# Patient Record
Sex: MALE | Race: WHITE | Employment: FULL TIME | URBAN - METROPOLITAN AREA
[De-identification: names, ages, dates, MRNs, and addresses within clinical notes are randomized per-mention and may not be internally consistent; named-entity substitution may affect disease eponyms.]

---

## 2019-12-07 ENCOUNTER — HOSPITAL ENCOUNTER (OUTPATIENT)
Age: 59
Discharge: HOME | End: 2019-12-07
Attending: FAMILY MEDICINE
Payer: COMMERCIAL

## 2019-12-07 VITALS
WEIGHT: 240 LBS | OXYGEN SATURATION: 96 % | SYSTOLIC BLOOD PRESSURE: 144 MMHG | RESPIRATION RATE: 18 BRPM | HEIGHT: 73 IN | DIASTOLIC BLOOD PRESSURE: 83 MMHG | TEMPERATURE: 97 F | HEART RATE: 79 BPM | BODY MASS INDEX: 31.81 KG/M2

## 2019-12-07 DIAGNOSIS — J01.00 ACUTE MAXILLARY SINUSITIS, RECURRENCE NOT SPECIFIED: Primary | ICD-10-CM

## 2019-12-07 DIAGNOSIS — Z86.69 HISTORY OF SLEEP APNEA: ICD-10-CM

## 2019-12-07 DIAGNOSIS — K12.2 UVULITIS: ICD-10-CM

## 2019-12-07 PROCEDURE — 99204 OFFICE O/P NEW MOD 45 MIN: CPT

## 2019-12-07 PROCEDURE — 99203 OFFICE O/P NEW LOW 30 MIN: CPT

## 2019-12-07 RX ORDER — AMOXICILLIN AND CLAVULANATE POTASSIUM 875; 125 MG/1; MG/1
1 TABLET, FILM COATED ORAL 2 TIMES DAILY
Qty: 20 TABLET | Refills: 0 | Status: SHIPPED | OUTPATIENT
Start: 2019-12-07 | End: 2019-12-17

## 2019-12-07 RX ORDER — RANITIDINE 150 MG/1
150 CAPSULE ORAL 2 TIMES DAILY
COMMUNITY
End: 2021-09-21

## 2019-12-07 NOTE — ED INITIAL ASSESSMENT (HPI)
Pt c/o productive cough with brownish-greenish colored phlegm, post nasal drip, a little achy, chills. Symptoms x 6 days. Denies MURALI, no known fever.

## 2019-12-07 NOTE — ED PROVIDER NOTES
Patient Seen in: [facility]      History   Patient presents with:  Cough/URI    Stated Complaint: congestion and turning into cough x 6 days    HPI  This is a 43-year-old male coming in with complaints of a productive cough normal, nares normal  NECK: FROM, supple anterior cervical lymphadenopathy noted bilaterally  LUNGS: CTAB, no RRW  CV: RRR  ABD: not distended  NEURO: Alert and oriented to person place and time  GAIT: Normal      ED Course   Labs Reviewed - No data to dis Impression:  Acute maxillary sinusitis, recurrence not specified  (primary encounter diagnosis)  Uvulitis  History of sleep apnea    Disposition:  Discharge  12/7/2019  1:14 pm    Follow-up:  Dorian Martin, [address]

## 2021-09-21 PROCEDURE — 99282 EMERGENCY DEPT VISIT SF MDM: CPT

## 2021-09-21 PROCEDURE — 12001 RPR S/N/AX/GEN/TRNK 2.5CM/<: CPT

## 2021-09-21 PROCEDURE — 99283 EMERGENCY DEPT VISIT LOW MDM: CPT

## 2021-09-21 RX ORDER — FAMOTIDINE 20 MG/1
20 TABLET ORAL DAILY
COMMUNITY

## 2021-09-22 ENCOUNTER — HOSPITAL ENCOUNTER (EMERGENCY)
Facility: HOSPITAL | Age: 61
Discharge: HOME | End: 2021-09-22
Attending: EMERGENCY MEDICINE
Payer: COMMERCIAL

## 2021-09-22 VITALS
DIASTOLIC BLOOD PRESSURE: 97 MMHG | HEIGHT: 73 IN | TEMPERATURE: 96 F | BODY MASS INDEX: 33.13 KG/M2 | OXYGEN SATURATION: 97 % | SYSTOLIC BLOOD PRESSURE: 141 MMHG | WEIGHT: 250 LBS | RESPIRATION RATE: 18 BRPM | HEART RATE: 68 BPM

## 2021-09-22 DIAGNOSIS — S91.311A LACERATION OF RIGHT FOOT, INITIAL ENCOUNTER: Primary | ICD-10-CM

## 2021-09-22 NOTE — ED PROVIDER NOTES
Patient Seen in: [facility] Emergency Department      History   Patient presents with:  Leg or Foot Injury: Glass object dropped and broken on foot.      Stated Complaint:     Subjective:   HPI    Patient is a 29-year-old male presents emergency room both lower extremities.   There is a superficial abrasion noted to the medial aspect of the right foot there is also a separate superficial laceration noted to the medial aspect of the right foot measuring approximately 1.5 cm in length with no evidence of

## 2021-09-30 ENCOUNTER — HOSPITAL ENCOUNTER (OUTPATIENT)
Age: 61
Discharge: HOME | End: 2021-09-30
Payer: COMMERCIAL

## 2021-09-30 VITALS
RESPIRATION RATE: 16 BRPM | OXYGEN SATURATION: 96 % | HEART RATE: 81 BPM | HEIGHT: 73 IN | BODY MASS INDEX: 33.13 KG/M2 | WEIGHT: 250 LBS | DIASTOLIC BLOOD PRESSURE: 90 MMHG | SYSTOLIC BLOOD PRESSURE: 145 MMHG | TEMPERATURE: 99 F

## 2021-09-30 DIAGNOSIS — Z48.02 ENCOUNTER FOR REMOVAL OF SUTURES: Primary | ICD-10-CM

## 2021-09-30 NOTE — ED PROVIDER NOTES
Patient Seen in: [facility] [address]      History   Patient presents with:  Sut Stap Vel    Stated Complaint: suture removal     Subjective:   HPI    65yo male presents to IC for suture removal. Had sutures placed to right foot 9 days Course   Labs Reviewed - No data to display                MDM      65yo male presents for suture removal. All sutures removed without difficulty.                              Disposition and Plan     Clinical Impression:  Encounter for removal of sutures

## 2024-03-11 ENCOUNTER — APPOINTMENT (OUTPATIENT)
Dept: GENERAL RADIOLOGY | Facility: HOSPITAL | Age: 64
End: 2024-03-11
Attending: EMERGENCY MEDICINE
Payer: COMMERCIAL

## 2024-03-11 ENCOUNTER — HOSPITAL ENCOUNTER (EMERGENCY)
Facility: HOSPITAL | Age: 64
Discharge: HOME | End: 2024-03-11
Attending: EMERGENCY MEDICINE
Payer: COMMERCIAL

## 2024-03-11 ENCOUNTER — APPOINTMENT (OUTPATIENT)
Dept: CV DIAGNOSTICS | Facility: HOSPITAL | Age: 64
End: 2024-03-11
Attending: EMERGENCY MEDICINE
Payer: COMMERCIAL

## 2024-03-11 VITALS
HEART RATE: 62 BPM | HEIGHT: 73 IN | BODY MASS INDEX: 32.47 KG/M2 | RESPIRATION RATE: 17 BRPM | DIASTOLIC BLOOD PRESSURE: 76 MMHG | OXYGEN SATURATION: 94 % | WEIGHT: 245 LBS | TEMPERATURE: 97 F | SYSTOLIC BLOOD PRESSURE: 132 MMHG

## 2024-03-11 DIAGNOSIS — R07.89 ACUTE CHEST WALL PAIN: Primary | ICD-10-CM

## 2024-03-11 LAB
ALBUMIN SERPL-MCNC: 4.3 G/DL (ref 3.4–5)
ALBUMIN/GLOB SERPL: 1.1 {RATIO} (ref 1–2)
ALP LIVER SERPL-CCNC: 91 U/L
ALT SERPL-CCNC: 59 U/L
ANION GAP SERPL CALC-SCNC: 3 MMOL/L (ref 0–18)
AST SERPL-CCNC: 54 U/L (ref 15–37)
BASOPHILS # BLD AUTO: 0.09 X10(3) UL (ref 0–0.2)
BASOPHILS NFR BLD AUTO: 1 %
BILIRUB SERPL-MCNC: 0.7 MG/DL (ref 0.1–2)
BUN BLD-MCNC: 26 MG/DL (ref 9–23)
CALCIUM BLD-MCNC: 10.3 MG/DL (ref 8.5–10.1)
CHLORIDE SERPL-SCNC: 106 MMOL/L (ref 98–112)
CO2 SERPL-SCNC: 26 MMOL/L (ref 21–32)
CREAT BLD-MCNC: 0.98 MG/DL
EGFRCR SERPLBLD CKD-EPI 2021: 88 ML/MIN/1.73M2 (ref 60–?)
EOSINOPHIL # BLD AUTO: 0.31 X10(3) UL (ref 0–0.7)
EOSINOPHIL NFR BLD AUTO: 3.6 %
ERYTHROCYTE [DISTWIDTH] IN BLOOD BY AUTOMATED COUNT: 12.4 %
GLOBULIN PLAS-MCNC: 3.8 G/DL (ref 2.8–4.4)
GLUCOSE BLD-MCNC: 125 MG/DL (ref 70–99)
HCT VFR BLD AUTO: 48.8 %
HGB BLD-MCNC: 16.8 G/DL
IMM GRANULOCYTES # BLD AUTO: 0.07 X10(3) UL (ref 0–1)
IMM GRANULOCYTES NFR BLD: 0.8 %
LYMPHOCYTES # BLD AUTO: 3.44 X10(3) UL (ref 1–4)
LYMPHOCYTES NFR BLD AUTO: 39.7 %
MCH RBC QN AUTO: 30.8 PG (ref 26–34)
MCHC RBC AUTO-ENTMCNC: 34.4 G/DL (ref 31–37)
MCV RBC AUTO: 89.5 FL
MONOCYTES # BLD AUTO: 0.67 X10(3) UL (ref 0.1–1)
MONOCYTES NFR BLD AUTO: 7.7 %
NEUTROPHILS # BLD AUTO: 4.09 X10 (3) UL (ref 1.5–7.7)
NEUTROPHILS # BLD AUTO: 4.09 X10(3) UL (ref 1.5–7.7)
NEUTROPHILS NFR BLD AUTO: 47.2 %
OSMOLALITY SERPL CALC.SUM OF ELEC: 286 MOSM/KG (ref 275–295)
PLATELET # BLD AUTO: 147 10(3)UL (ref 150–450)
POTASSIUM SERPL-SCNC: 4.5 MMOL/L (ref 3.5–5.1)
PROT SERPL-MCNC: 8.1 G/DL (ref 6.4–8.2)
RBC # BLD AUTO: 5.45 X10(6)UL
SARS-COV-2 RNA RESP QL NAA+PROBE: NOT DETECTED
SODIUM SERPL-SCNC: 135 MMOL/L (ref 136–145)
TROPONIN I SERPL HS-MCNC: 11 NG/L
TROPONIN I SERPL HS-MCNC: 17 NG/L
WBC # BLD AUTO: 8.7 X10(3) UL (ref 4–11)

## 2024-03-11 PROCEDURE — 93350 STRESS TTE ONLY: CPT | Performed by: EMERGENCY MEDICINE

## 2024-03-11 PROCEDURE — 93005 ELECTROCARDIOGRAM TRACING: CPT

## 2024-03-11 PROCEDURE — 93010 ELECTROCARDIOGRAM REPORT: CPT

## 2024-03-11 PROCEDURE — 85025 COMPLETE CBC W/AUTO DIFF WBC: CPT | Performed by: EMERGENCY MEDICINE

## 2024-03-11 PROCEDURE — 99285 EMERGENCY DEPT VISIT HI MDM: CPT

## 2024-03-11 PROCEDURE — 93018 CV STRESS TEST I&R ONLY: CPT | Performed by: EMERGENCY MEDICINE

## 2024-03-11 PROCEDURE — 96374 THER/PROPH/DIAG INJ IV PUSH: CPT

## 2024-03-11 PROCEDURE — 71045 X-RAY EXAM CHEST 1 VIEW: CPT | Performed by: EMERGENCY MEDICINE

## 2024-03-11 PROCEDURE — 93017 CV STRESS TEST TRACING ONLY: CPT | Performed by: EMERGENCY MEDICINE

## 2024-03-11 PROCEDURE — 80053 COMPREHEN METABOLIC PANEL: CPT | Performed by: EMERGENCY MEDICINE

## 2024-03-11 PROCEDURE — 84484 ASSAY OF TROPONIN QUANT: CPT | Performed by: EMERGENCY MEDICINE

## 2024-03-11 RX ORDER — KETOROLAC TROMETHAMINE 15 MG/ML
15 INJECTION, SOLUTION INTRAMUSCULAR; INTRAVENOUS ONCE
Status: COMPLETED | OUTPATIENT
Start: 2024-03-11 | End: 2024-03-11

## 2024-03-11 RX ORDER — TAMSULOSIN HYDROCHLORIDE 0.4 MG/1
0.4 CAPSULE ORAL
COMMUNITY

## 2024-03-11 RX ORDER — MULTIVITAMIN WITH IRON
50 TABLET ORAL DAILY
COMMUNITY

## 2024-03-11 RX ORDER — LISINOPRIL 10 MG/1
10 TABLET ORAL DAILY
COMMUNITY

## 2024-03-11 NOTE — ED PROVIDER NOTES
Patient Seen in: [facility] Emergency Department      History     Chief Complaint   Patient presents with    Chest Pain Angina     Stated Complaint: cp since 0230    Subjective:   HPI    63-year-old male presents reporting chest pain that began 4 hours ago.  He describes a pressure to the midsternal and left chest area.  He does report that some movements seem to make the pain worse.  He feels a bit lightheaded with it.  Denies any significant shortness of breath.  No palpitations.  No swelling in the extremities.  He reports some swimming yesterday and does not know if it could be musculoskeletal.  He says the pain fluctuates in intensity.    Objective:   Past Medical History:   Diagnosis Date    Anxiety     Diabetes (HCC)     Esophageal reflux     Essential hypertension     Gout     Hyperlipidemia               Past Surgical History:   Procedure Laterality Date    ANGIOGRAM  2009    TONSILLECTOMY                  Social History     Socioeconomic History    Marital status:    Tobacco Use    Smoking status: Never    Smokeless tobacco: Never   Vaping Use    Vaping Use: Never used   Substance and Sexual Activity    Alcohol use: Not Currently              Review of Systems    Positive for stated complaint: cp since 0230  Other systems are as noted in HPI.  Constitutional and vital signs reviewed.      All other systems reviewed and negative except as noted above.    Physical Exam     ED Triage Vitals [03/11/24 0624]   /85   Pulse 72   Resp 16   Temp 97 °F (36.1 °C)   Temp src Temporal   SpO2 99 %   O2 Device None (Room air)       Current:/76   Pulse 70   Temp 97 °F (36.1 °C) (Temporal)   Resp 18   Ht 185.4 cm (6' 1\")   Wt 111.1 kg   SpO2 97%   BMI 32.32 kg/m²         Physical Exam    General:  Vitals as listed.  No acute distress   HEENT: Sclerae anicteric.  Conjunctivae show no pallor.  Oropharynx clear, mucous membranes moist   Neck: supple, no rigidity   Lungs: good air exchange and  clear   Heart: regular rate rhythm and no murmur   Abdomen: Soft and nontender.  No abdominal masses.  No peritoneal signs   MSK: There is tenderness on palpation over the left anterior chest wall in the area of the costochondral margin.  Difficult to determine if this is the same pain that brought him to the emergency room.  No edema, normal peripheral pulses   Neuro: Alert oriented and nonfocal   Skin: no rashes or nodules    ED Course     Labs Reviewed   COMP METABOLIC PANEL (14) - Abnormal; Notable for the following components:       Result Value    Glucose 125 (*)     Sodium 135 (*)     BUN 26 (*)     Calcium, Total 10.3 (*)     AST 54 (*)     All other components within normal limits   CBC W/ DIFFERENTIAL - Abnormal; Notable for the following components:    .0 (*)     All other components within normal limits   TROPONIN I HIGH SENSITIVITY - Normal   TROPONIN I HIGH SENSITIVITY - Normal   RAPID SARS-COV-2 BY PCR - Normal   CBC WITH DIFFERENTIAL WITH PLATELET    Narrative:     The following orders were created for panel order CBC With Differential With Platelet.  Procedure                               Abnormality         Status                     ---------                               -----------         ------                     CBC W/ DIFFERENTIAL[[id number]]          Abnormal            Final result                 Please view results for these tests on the individual orders.   RAINBOW DRAW LAVENDER   RAINBOW DRAW LIGHT GREEN   RAINBOW DRAW BLUE     EKG    Rate, intervals and axes as noted on EKG Report.  Rate: 69  Rhythm: Sinus Rhythm  Reading: No evidence of acute ischemia                 XR CHEST AP PORTABLE  (CPT=71045)    Result Date: 3/11/2024  PROCEDURE:  XR CHEST AP PORTABLE  (CPT=71045)  TECHNIQUE:  AP chest radiograph was obtained.  COMPARISON:  [facility]REA , XR, CHEST AP PORT, 3/28/2013, 2:54 AM.  INDICATIONS:  cp since 0230  PATIENT STATED HISTORY: (As transcribed by Technologist)  Patient  states he has left-sided chest pain that started last night.    FINDINGS:  Cardiomediastinal silhouette is within normal limits in size.  Linear opacities adjacent to the left costophrenic angle are most consistent with atelectasis and or scar.  No focal consolidation, pleural effusion, or pneumothorax.            CONCLUSION:  Findings as detailed above suggest left lower lobe atelectasis and or scar, correlate clinically.  No focal consolidation.   LOCATION:  [facility]      Dictated by (CST): Gaviota Diaz MD on 3/11/2024 at 8:09 AM     Finalized by (CST): Gaviota Diaz MD on 3/11/2024 at 8:10 AM               MDM      63-year-old male presents with 4 hours of pressure to the left side of the chest.  History of hypertension and diabetes.  He reports his sister  suddenly of a heart attack at the age of 65.    Additional history obtained by patient's spouse at the bedside reports that there is a family history of cardiac disease and that the patient's sister, who was very healthy and an athlete,  suddenly of a heart attack at the age of 65    Differential includes but is not limited to musculoskeletal pain, cardiac ischemia, pneumothorax, a life threat.    CBC, CMP, troponin, chest x-ray ordered for further evaluation.    My independent interpretation of chest x-ray is that there is no pneumothorax.    Initial cardiac enzyme is within normal limits.  EKG does not show ST elevation MI.  Patient does have a few risk factors.    Cardiac enzyme negative x 2 at a 2-hour interval.  Patient also had a stress echo.  Dr. Daugherty reports that stress echo was normal and patient is cleared from a cardiology standpoint.  He did have significant improvement in his pain after Toradol.  I believe this appears more musculoskeletal.  His pain is in the area of the costochondral margin but could also be related to muscle pain from swimming yesterday.  Discussed OTC pain medications and ice/heat to the area of tenderness.  Recommend  follow-up with primary care doctor.  He is comfortable with this plan and relieved that the workup today is unremarkable.  Instructed to return with worsening symptoms or any concerns.                                       Medical Decision Making      Disposition and Plan     Clinical Impression:  1. Acute chest wall pain         Disposition:  Discharge  3/11/2024  1:07 pm    Follow-up:  Cely Mariscal MD  [address] [address] [phone]    Schedule an appointment as soon as possible for a visit      Ernie Daugherty MD  [address] [place] [address] [phone]    Follow up  Cardiology specialist, As needed          Medications Prescribed:  Current Discharge Medication List

## 2024-03-11 NOTE — PROGRESS NOTES
Here for stress echo.  Walked for 6:46 on a Valentín and achieved a target HR 93% APMHR.  Pain free throughout exam.  Returned to ED  B2 per w/c with wife.

## 2024-03-12 LAB
ATRIAL RATE: 69 BPM
P AXIS: 47 DEGREES
P-R INTERVAL: 236 MS
Q-T INTERVAL: 358 MS
QRS DURATION: 84 MS
QTC CALCULATION (BEZET): 383 MS
R AXIS: 7 DEGREES
T AXIS: 44 DEGREES
VENTRICULAR RATE: 69 BPM

## 2024-09-07 ENCOUNTER — HOSPITAL ENCOUNTER (INPATIENT)
Facility: HOSPITAL | Age: 64
LOS: 1 days | Discharge: HOME | End: 2024-09-08
Attending: EMERGENCY MEDICINE | Admitting: STUDENT IN AN ORGANIZED HEALTH CARE EDUCATION/TRAINING PROGRAM
Payer: COMMERCIAL

## 2024-09-07 ENCOUNTER — APPOINTMENT (OUTPATIENT)
Dept: GENERAL RADIOLOGY | Facility: HOSPITAL | Age: 64
End: 2024-09-07
Attending: EMERGENCY MEDICINE
Payer: COMMERCIAL

## 2024-09-07 DIAGNOSIS — I48.91 ATRIAL FIBRILLATION WITH RAPID VENTRICULAR RESPONSE (HCC): Primary | ICD-10-CM

## 2024-09-07 LAB
ALBUMIN SERPL-MCNC: 4.6 G/DL (ref 3.2–4.8)
ALBUMIN/GLOB SERPL: 1.6 {RATIO} (ref 1–2)
ALP LIVER SERPL-CCNC: 105 U/L
ALT SERPL-CCNC: 35 U/L
ANION GAP SERPL CALC-SCNC: 11 MMOL/L (ref 0–18)
ANION GAP SERPL CALC-SCNC: 12 MMOL/L (ref 0–18)
APTT PPP: 26.9 SECONDS (ref 23–36)
APTT PPP: 34.6 SECONDS (ref 23–36)
APTT PPP: 36.7 SECONDS (ref 23–36)
AST SERPL-CCNC: 21 U/L (ref ?–34)
BASOPHILS # BLD AUTO: 0.08 X10(3) UL (ref 0–0.2)
BASOPHILS NFR BLD AUTO: 0.9 %
BILIRUB SERPL-MCNC: 0.4 MG/DL (ref 0.2–1.1)
BUN BLD-MCNC: 20 MG/DL (ref 9–23)
BUN BLD-MCNC: 24 MG/DL (ref 9–23)
CALCIUM BLD-MCNC: 10.2 MG/DL (ref 8.7–10.4)
CALCIUM BLD-MCNC: 10.4 MG/DL (ref 8.7–10.4)
CHLORIDE SERPL-SCNC: 103 MMOL/L (ref 98–112)
CHLORIDE SERPL-SCNC: 105 MMOL/L (ref 98–112)
CO2 SERPL-SCNC: 22 MMOL/L (ref 21–32)
CO2 SERPL-SCNC: 23 MMOL/L (ref 21–32)
CREAT BLD-MCNC: 0.81 MG/DL
CREAT BLD-MCNC: 0.89 MG/DL
D DIMER PPP FEU-MCNC: <0.27 UG/ML FEU (ref ?–0.61)
EGFRCR SERPLBLD CKD-EPI 2021: 100 ML/MIN/1.73M2 (ref 60–?)
EGFRCR SERPLBLD CKD-EPI 2021: 97 ML/MIN/1.73M2 (ref 60–?)
EOSINOPHIL # BLD AUTO: 0.31 X10(3) UL (ref 0–0.7)
EOSINOPHIL NFR BLD AUTO: 3.6 %
ERYTHROCYTE [DISTWIDTH] IN BLOOD BY AUTOMATED COUNT: 12.5 %
ERYTHROCYTE [DISTWIDTH] IN BLOOD BY AUTOMATED COUNT: 12.6 %
GLOBULIN PLAS-MCNC: 2.8 G/DL (ref 2–3.5)
GLUCOSE BLD-MCNC: 106 MG/DL (ref 70–99)
GLUCOSE BLD-MCNC: 128 MG/DL (ref 70–99)
GLUCOSE BLD-MCNC: 140 MG/DL (ref 70–99)
GLUCOSE BLD-MCNC: 145 MG/DL (ref 70–99)
GLUCOSE BLD-MCNC: 151 MG/DL (ref 70–99)
GLUCOSE BLD-MCNC: 165 MG/DL (ref 70–99)
GLUCOSE BLD-MCNC: 169 MG/DL (ref 70–99)
HCT VFR BLD AUTO: 45 %
HCT VFR BLD AUTO: 45.8 %
HGB BLD-MCNC: 16 G/DL
HGB BLD-MCNC: 16.2 G/DL
IMM GRANULOCYTES # BLD AUTO: 0.09 X10(3) UL (ref 0–1)
IMM GRANULOCYTES NFR BLD: 1 %
LYMPHOCYTES # BLD AUTO: 3.03 X10(3) UL (ref 1–4)
LYMPHOCYTES NFR BLD AUTO: 35.1 %
MAGNESIUM SERPL-MCNC: 1.8 MG/DL (ref 1.6–2.6)
MCH RBC QN AUTO: 31 PG (ref 26–34)
MCH RBC QN AUTO: 31.3 PG (ref 26–34)
MCHC RBC AUTO-ENTMCNC: 35.4 G/DL (ref 31–37)
MCHC RBC AUTO-ENTMCNC: 35.6 G/DL (ref 31–37)
MCV RBC AUTO: 87.6 FL
MCV RBC AUTO: 88.1 FL
MONOCYTES # BLD AUTO: 0.66 X10(3) UL (ref 0.1–1)
MONOCYTES NFR BLD AUTO: 7.6 %
NEUTROPHILS # BLD AUTO: 4.47 X10 (3) UL (ref 1.5–7.7)
NEUTROPHILS # BLD AUTO: 4.47 X10(3) UL (ref 1.5–7.7)
NEUTROPHILS NFR BLD AUTO: 51.8 %
NT-PROBNP SERPL-MCNC: <35 PG/ML (ref ?–125)
OSMOLALITY SERPL CALC.SUM OF ELEC: 292 MOSM/KG (ref 275–295)
OSMOLALITY SERPL CALC.SUM OF ELEC: 294 MOSM/KG (ref 275–295)
PLATELET # BLD AUTO: 162 10(3)UL (ref 150–450)
PLATELET # BLD AUTO: 165 10(3)UL (ref 150–450)
POTASSIUM SERPL-SCNC: 3.8 MMOL/L (ref 3.5–5.1)
POTASSIUM SERPL-SCNC: 4.2 MMOL/L (ref 3.5–5.1)
PROT SERPL-MCNC: 7.4 G/DL (ref 5.7–8.2)
RBC # BLD AUTO: 5.11 X10(6)UL
RBC # BLD AUTO: 5.23 X10(6)UL
SODIUM SERPL-SCNC: 138 MMOL/L (ref 136–145)
SODIUM SERPL-SCNC: 138 MMOL/L (ref 136–145)
T4 FREE SERPL-MCNC: 1.2 NG/DL (ref 0.8–1.7)
TROPONIN I SERPL HS-MCNC: <3 NG/L
TSI SER-ACNC: 7.29 MIU/ML (ref 0.55–4.78)
WBC # BLD AUTO: 10.4 X10(3) UL (ref 4–11)
WBC # BLD AUTO: 8.6 X10(3) UL (ref 4–11)

## 2024-09-07 PROCEDURE — 71045 X-RAY EXAM CHEST 1 VIEW: CPT | Performed by: EMERGENCY MEDICINE

## 2024-09-07 PROCEDURE — 99223 1ST HOSP IP/OBS HIGH 75: CPT | Performed by: STUDENT IN AN ORGANIZED HEALTH CARE EDUCATION/TRAINING PROGRAM

## 2024-09-07 RX ORDER — METOPROLOL SUCCINATE 25 MG/1
25 TABLET, EXTENDED RELEASE ORAL
Status: DISCONTINUED | OUTPATIENT
Start: 2024-09-07 | End: 2024-09-08

## 2024-09-07 RX ORDER — HEPARIN SODIUM 1000 [USP'U]/ML
5000 INJECTION, SOLUTION INTRAVENOUS; SUBCUTANEOUS ONCE
Status: COMPLETED | OUTPATIENT
Start: 2024-09-07 | End: 2024-09-07

## 2024-09-07 RX ORDER — NICOTINE POLACRILEX 4 MG
30 LOZENGE BUCCAL
Status: DISCONTINUED | OUTPATIENT
Start: 2024-09-07 | End: 2024-09-08

## 2024-09-07 RX ORDER — BENZONATATE 100 MG/1
200 CAPSULE ORAL 3 TIMES DAILY PRN
Status: DISCONTINUED | OUTPATIENT
Start: 2024-09-07 | End: 2024-09-08

## 2024-09-07 RX ORDER — SODIUM PHOSPHATE, DIBASIC AND SODIUM PHOSPHATE, MONOBASIC 7; 19 G/230ML; G/230ML
1 ENEMA RECTAL ONCE AS NEEDED
Status: DISCONTINUED | OUTPATIENT
Start: 2024-09-07 | End: 2024-09-08

## 2024-09-07 RX ORDER — MELATONIN
3 NIGHTLY PRN
Status: DISCONTINUED | OUTPATIENT
Start: 2024-09-07 | End: 2024-09-08

## 2024-09-07 RX ORDER — DILTIAZEM HYDROCHLORIDE 5 MG/ML
10 INJECTION INTRAVENOUS ONCE
Status: COMPLETED | OUTPATIENT
Start: 2024-09-07 | End: 2024-09-07

## 2024-09-07 RX ORDER — ROSUVASTATIN CALCIUM 5 MG/1
5 TABLET, COATED ORAL NIGHTLY
COMMUNITY

## 2024-09-07 RX ORDER — POLYETHYLENE GLYCOL 3350 17 G/17G
17 POWDER, FOR SOLUTION ORAL DAILY PRN
Status: DISCONTINUED | OUTPATIENT
Start: 2024-09-07 | End: 2024-09-08

## 2024-09-07 RX ORDER — ONDANSETRON 2 MG/ML
4 INJECTION INTRAMUSCULAR; INTRAVENOUS EVERY 6 HOURS PRN
Status: DISCONTINUED | OUTPATIENT
Start: 2024-09-07 | End: 2024-09-08

## 2024-09-07 RX ORDER — ACETAMINOPHEN 500 MG
500 TABLET ORAL EVERY 4 HOURS PRN
Status: DISCONTINUED | OUTPATIENT
Start: 2024-09-07 | End: 2024-09-08

## 2024-09-07 RX ORDER — SENNOSIDES 8.6 MG
17.2 TABLET ORAL NIGHTLY PRN
Status: DISCONTINUED | OUTPATIENT
Start: 2024-09-07 | End: 2024-09-08

## 2024-09-07 RX ORDER — BISACODYL 10 MG
10 SUPPOSITORY, RECTAL RECTAL
Status: DISCONTINUED | OUTPATIENT
Start: 2024-09-07 | End: 2024-09-08

## 2024-09-07 RX ORDER — MAGNESIUM OXIDE 400 MG/1
400 TABLET ORAL ONCE
Status: DISCONTINUED | OUTPATIENT
Start: 2024-09-07 | End: 2024-09-07

## 2024-09-07 RX ORDER — HEPARIN SODIUM AND DEXTROSE 10000; 5 [USP'U]/100ML; G/100ML
INJECTION INTRAVENOUS CONTINUOUS
Status: DISCONTINUED | OUTPATIENT
Start: 2024-09-07 | End: 2024-09-08

## 2024-09-07 RX ORDER — PROCHLORPERAZINE EDISYLATE 5 MG/ML
5 INJECTION INTRAMUSCULAR; INTRAVENOUS EVERY 8 HOURS PRN
Status: DISCONTINUED | OUTPATIENT
Start: 2024-09-07 | End: 2024-09-08

## 2024-09-07 RX ORDER — NICOTINE POLACRILEX 4 MG
15 LOZENGE BUCCAL
Status: DISCONTINUED | OUTPATIENT
Start: 2024-09-07 | End: 2024-09-08

## 2024-09-07 RX ORDER — ECHINACEA PURPUREA EXTRACT 125 MG
1 TABLET ORAL
Status: DISCONTINUED | OUTPATIENT
Start: 2024-09-07 | End: 2024-09-08

## 2024-09-07 RX ORDER — DEXTROSE MONOHYDRATE 25 G/50ML
50 INJECTION, SOLUTION INTRAVENOUS
Status: DISCONTINUED | OUTPATIENT
Start: 2024-09-07 | End: 2024-09-08

## 2024-09-07 RX ORDER — HEPARIN SODIUM 1000 [USP'U]/ML
3000 INJECTION, SOLUTION INTRAVENOUS; SUBCUTANEOUS ONCE
Status: COMPLETED | OUTPATIENT
Start: 2024-09-07 | End: 2024-09-07

## 2024-09-07 RX ORDER — ASPIRIN 81 MG/1
81 TABLET ORAL DAILY
COMMUNITY

## 2024-09-07 RX ORDER — HEPARIN SODIUM AND DEXTROSE 10000; 5 [USP'U]/100ML; G/100ML
1000 INJECTION INTRAVENOUS ONCE
Status: COMPLETED | OUTPATIENT
Start: 2024-09-07 | End: 2024-09-07

## 2024-09-07 RX ORDER — MAGNESIUM OXIDE 400 MG/1
400 TABLET ORAL ONCE
Status: COMPLETED | OUTPATIENT
Start: 2024-09-07 | End: 2024-09-07

## 2024-09-07 NOTE — CONSULTS
[facility]  Report of Consultation    Ambrose Palomares Patient Status:  Inpatient    10/30/1960 MRN [id number]   [facility] 3NE-A Attending Shlomo Downs, DO   Hosp Day # 0 PCP Cely Mariscal MD     Reason for Consultation:   AFib    History of Present Illness:   Ambrose Palomares is a(n) 63 year old male with a past history of HTN, HL, DM,  MITZY, and anxiety.  CT calcium score of 875 noted .  Stress Echo 3/24 had been performed with no ischemia noted.  Pt has presented after feeling \"off\" this week.  Had noted palpitations and mild orthopnea over the last week when lying in bed at night.  More notable lying on his L.  When up during the day, did not feel particularly poorly.  Has remained active.  No CP.  No SOB.  No presyncope or syncope.  Proceeded to ER where Fib with mildly elevated rate noted.          Past Medical History:   Past Medical History:    Anxiety    Diabetes (HCC)    Esophageal reflux    Essential hypertension    Gout    High blood pressure    High cholesterol    Hyperlipidemia    Visual impairment       Social History:   Smoking:  None  Alcohol:  None    Family History:   Positive family history of premature arthrosclerotic heart disease     Medications:   Scheduled:    insulin aspart  2-10 Units Subcutaneous q6h    magnesium oxide  400 mg Oral Once       Continuous Infusion:    dilTIAZem 5 mg/hr (24 1116)    continuous dose heparin         PRN Medications:     acetaminophen    melatonin    polyethylene glycol (PEG 3350)    sennosides    bisacodyl    fleet enema    ondansetron    prochlorperazine    benzonatate    glycerin-hypromellose-    sodium chloride    glucose **OR** glucose **OR** glucose-vitamin C **OR** dextrose **OR** glucose **OR** glucose **OR** glucose-vitamin C    Outpatient Medications:   No current facility-administered medications on file prior to encounter.     Current Outpatient Medications on File Prior to Encounter   Medication Sig Dispense  Refill    rosuvastatin 5 MG Oral Tab Take 1 tablet (5 mg total) by mouth nightly.      aspirin 81 MG Oral Tab EC Take 1 tablet (81 mg total) by mouth daily.      tamsulosin 0.4 MG Oral Cap Take 1 capsule (0.4 mg total) by mouth.      metFORMIN 500 MG Oral Tab Take 1 tablet (500 mg total) by mouth 2 (two) times daily with meals.      lisinopril 10 MG Oral Tab Take 1 tablet (10 mg total) by mouth daily.      Loratadine 5 MG Oral Chew Tab Chew 1 tablet (5 mg total) by mouth daily.      vitamin B-12 50 MCG Oral Tab Take 1 tablet (50 mcg total) by mouth daily.      famotidine 20 MG Oral Tab Take 1 tablet (20 mg total) by mouth 2 (two) times daily.      ALLOPURINOL OR Take by mouth.      ESCITALOPRAM OXALATE OR Take by mouth.      Ergocalciferol (VITAMIN D OR) Take by mouth.      Omega-3 Fatty Acids (FISH OIL OR) Take by mouth.         Allergies:   No Known Allergies    Review of Systems:   No fevers, chills, change in weight or bowel habits.  Ten point review of systems is otherwise negative or unremarkable.    Physical Exam:   Vitals:    09/07/24 0719   BP: 112/76   Pulse: 65   Resp: 19   Temp: 98 °F (36.7 °C)     Wt Readings from Last 3 Encounters:   09/07/24 240 lb 1.3 oz (108.9 kg)   03/11/24 245 lb (111.1 kg)   09/30/21 250 lb (113.4 kg)           General: Well developed, well nourished male.  Pt is in no acute distress.  HEENT:   Normocephalic.  Atraumatic.  Eyes with no scleral icterus.  Neck: Supple.  No JVD.  Carotids 2+ and equal in symmetric fashion.  No bruits are noted.  Cardiac: Irregular rate and rhythm.   There is a normal S1 and S2.  No S3 or S4.  No murmurs, rubs, or gallops.  PMI is non-displaced with a normal apical impulse.  Lungs: Clear to ascultation bilaterally.  No focal rales, rhonchi, or wheezes.  Good air movement is noted throughout all lung fields.  Abdomen: Soft.  Non-distended.  Non-tender.  Bowel sounds are present and normoactive.  No guarding or rebound.   Extremities: Extremities do  not demonstrate any evidence of peripheral edema.   No cyanosis or clubbing of the digits is appreciated.  Femoral, Dorsalis Pedis, and Posterior Tibialis  pulses are 2+ and equal in a symmetric fashion.  Neurologic: Alert and oriented, normal affect.  No gross deficit appreciated.  Integument:  No visible rashes are appreciated.      Laboratories and Data:   Labs:    Recent Labs   Lab 09/07/24 0426 09/07/24  0837   * 151*   BUN 24* 20   CREATSERUM 0.89 0.81   CA 10.4 10.2   ALB 4.6  --     138   K 3.8 4.2    105   CO2 23.0 22.0   ALKPHO 105  --    AST 21  --    ALT 35  --    BILT 0.4  --    TP 7.4  --        Recent Labs   Lab 09/07/24 0426 09/07/24  0837   RBC 5.11 5.23   HGB 16.0 16.2   HCT 45.0 45.8   MCV 88.1 87.6   MCH 31.3 31.0   MCHC 35.6 35.4   RDW 12.5 12.6   NEPRELIM 4.47  --    WBC 8.6 10.4   .0 165.0       No results for input(s): \"PTP\", \"INR\" in the last 168 hours.    No results for input(s): \"TROP\", \"CK\" in the last 168 hours.    Diagnostics:   Tele: AFib - rate controlled  EKG: AFib 102        Assessment:   AFib  -New onset  -AIDEE 2 VASC = 3 (HTN, DM, Vasc Dz)   -Rate now controlled  2.     HTN  3.     HL  4.     DM  5.     MITZY  6.     Elevated TSH        Plan:   IV Heparin  -Transition to Eliquis prior to discharge   ToproL XL 25mg Po qAM   Stop Lisinopril   Echo   TFTs per primary [facility]   Tele monitor    Chetan Junior MD  9/7/2024  12:04 PM

## 2024-09-07 NOTE — ED PROVIDER NOTES
Patient Seen in: [facility] Emergency Department      History     Chief Complaint   Patient presents with    Arrythmia/Palpitations     Awoke with rapid heart rate     Stated Complaint: Irregular heartbeat    Subjective:   HPI    Patient is a 63-year-old male presents to ED for evaluation of palpitations, shortness of breath.  Patient states of last 3 or 4 nights he has been noticing shortness of breath that wakes him up from sleep which causes him to be anxious.  Tonight, he noted some fluttering in his chest.  He has no shortness of breath during the day.  Patient denies history of atrial fibrillation.  Patient takes baby aspirin.  Denies history of coronary disease.  Patient states he had a stress test within the last year showing no evidence for heart disease.  Patient denies frequent alcohol use.  Denies fever or cough.  Patient denies thromboembolic risk factors such as family history, smoking, recent travel, recent surgery.    Objective:   Past Medical History:    Anxiety    Diabetes (HCC)    Esophageal reflux    Essential hypertension    Gout    Hyperlipidemia              Past Surgical History:   Procedure Laterality Date    Angiogram  2009    Tonsillectomy                  Social History     Socioeconomic History    Marital status:    Tobacco Use    Smoking status: Never    Smokeless tobacco: Never   Vaping Use    Vaping status: Never Used   Substance and Sexual Activity    Alcohol use: Not Currently    Drug use: Never     Social Determinants of Health     Financial Resource Strain: Low Risk  (8/19/2024)    Received from [facility]    Overall Financial Resource Strain (CARDIA)     Difficulty of Paying Living Expenses: Not hard at all   Food Insecurity: No Food Insecurity (8/19/2024)    Received from [facility]    Hunger Vital Sign     Worried About Running Out of Food in the Last Year: Never true     Ran Out of Food in the Last Year: Never true    Transportation Needs: No Transportation Needs (8/19/2024)    Received from [facility]    PRAPARE - Transportation     Lack of Transportation (Medical): No     Lack of Transportation (Non-Medical): No   Housing Stability: Low Risk  (8/19/2024)    Received from [facility]    Housing Stability Vital Sign     Unable to Pay for Housing in the Last Year: No     Number of Places Lived in the Last Year: 1     In the last 12 months, was there a time when you did not have a steady place to sleep or slept in a shelter (including now)?: No              Review of Systems    Positive for stated Chief Complaint: Arrythmia/Palpitations (Awoke with rapid heart rate)    Other systems are as noted in HPI.  Constitutional and vital signs reviewed.      All other systems reviewed and negative except as noted above.    Physical Exam     ED Triage Vitals [09/07/24 0425]   BP (!) 159/110   Pulse 110   Resp 21   Temp 98.2 °F (36.8 °C)   Temp src Oral   SpO2 100 %   O2 Device None (Room air)       Current Vitals:   Vital Signs  BP: 130/87  Pulse: 80  Resp: 23  Temp: 98.2 °F (36.8 °C)  Temp src: Oral  MAP (mmHg): 99    Oxygen Therapy  SpO2: 98 %  O2 Device: None (Room air)            Physical Exam    GENERAL: No acute distress, well appearing and non-toxic, Alert and oriented X 3   HEENT: Normocephalic, atraumatic.  Moist mucous membranes.  Pupils equal round reactive to light and accommodation, extraocular motion is intact, sclerae white, conjunctiva is pink.  Oropharynx is unremarkable, no exudate.  NECK: Supple, trachea midline, no lymphadenopathy.   LUNG: Lungs clear to auscultation bilaterally, no wheezing, no rales, no rhonchi.  CARDIOVASCULAR: Tachycardic.  Irregularly irregular rate and rhythm.  Normal S1S2.  No S3S4 or murmur.  ABDOMEN: Bowel sounds are present. Soft. nondistended, no pulsatile masses. nontender  MUSCULOSKELETAL: No calf tenderness.  Dorsalis and Posterior Tibial pulses  present. No clubbing. No cyanosis.  No edema.   SKIN EXAMINATIoN: Warm and dry with normal appearance.  No rashes or lesions.  NEUROLOGICAL:  Motor strength intact all groups.  normal sensation, speech intact    ED Course     Labs Reviewed   COMP METABOLIC PANEL (14) - Abnormal; Notable for the following components:       Result Value    Glucose 169 (*)     BUN 24 (*)     All other components within normal limits   TSH W REFLEX TO FREE T4 - Abnormal; Notable for the following components:    TSH 7.287 (*)     All other components within normal limits   TROPONIN I HIGH SENSITIVITY - Normal   MAGNESIUM - Normal   PRO BETA NATRIURETIC PEPTIDE - Normal   D-DIMER - Normal   T4, FREE (S) - Normal   PTT, ACTIVATED - Normal   CBC WITH DIFFERENTIAL WITH PLATELET   RAINBOW DRAW BLUE     EKG    Rate, intervals and axes as noted on EKG Report.  Rate: 102  Rhythm: Atrial Fibrillation  Reading: No acute changes                 I personally reviewed xray films of chest and independent interpretation shows no evidence for heart failure.  I also reviewed formal xray report as read by radiology with findings below:       Xray of chest read by [employer] rad radiology shows no evidence for acute process    Medications   dilTIAZem 10 mg BOLUS FROM BAG infusion (10 mg Intravenous Bolus from Bag 9/7/24 7438)     And   dilTIAZem (cardIZEM) 100 mg in sodium chloride 0.9% 100 mL IVPB-ADDV (10 mg/hr Intravenous Rate/Dose Change 9/7/24 4448)   heparin (Porcine) 1000 UNIT/ML injection - BOLUS IV 5,000 Units (has no administration in time range)   heparin (Porcine) 34856 units/250mL infusion ED INITIAL DOSE (has no administration in time range)   heparin (Porcine) 49881 units/250mL infusion ACS/AFIB CONTINUOUS (has no administration in time range)   dilTIAZem (cardIZEM) 25 mg/5mL injection 10 mg (10 mg Intravenous Given 9/7/24 7059)           MDM      Patient is a 63-year-old male presents to ED for evaluation of shortness of breath, palpitations.   Differential CHF, ACS, hyperthyroidism, rapid atrial fibrillation.  Patient laboratory test performed showing elevated TSH with normal free T4.  Normal troponin and D-dimer.  BNP normal.  Chest x-ray negative for CHF.  Initial EKG showing rapid atrial fibrillation.  Patient given 10 mg of IV Cardizem and started on Cardizem drip.  Heart rate was better controlled ranging anywhere from 70s to the low 100s.  He was given a second dose of 10 mg of IV Cardizem.  Case was discussed with Dr. Junior from cardiology who recommend admission for rate control.  [id number] 2 score of 2 so anticoagulation recommended and IV heparin was started.  Case discussed with hospitalist.  Patient will be admitted to cardiac telemetry.  Critical care time was 35 minutes. This critical care time is exclusive of procedures critical care time includes monitoring of patient's cardiopulmonary and hemodynamic status, interpretation of laboratory values, and discussion of case with physician and consultants.  Workup and results were discussed with patient. Patient has no other questions, complaints or concerns. Patient will be admitted to the hospital for further workup.  Admission disposition: 9/7/2024  5:42 AM               External and old record review was performed.  I reviewed cardiac stress echo 3/11/2024 normal                             Medical Decision Making      Disposition and Plan     Clinical Impression:  1. Atrial fibrillation with rapid ventricular response (HCC)    2.  Elevated TSH    Disposition:  Admit  9/7/2024  5:42 am    Follow-up:  No follow-up provider specified.        Medications Prescribed:  Current Discharge Medication List                            Hospital Problems       Present on Admission  Date Reviewed: 9/30/2021            ICD-10-CM Noted POA    * (Principal) Atrial fibrillation with rapid ventricular response (HCC) I48.91 9/7/2024 Unknown

## 2024-09-07 NOTE — ED INITIAL ASSESSMENT (HPI)
Pt awoke from sleep with shortness of breath. Pt states he has experienced shortness of breath for the last four nights. Pt denies pain.

## 2024-09-07 NOTE — PROGRESS NOTES
NURSING ADMISSION NOTE      Patient admitted from ED around 0650  Oriented to room.  Safety precautions initiated.  Bed in low position.  Call light in reach.  Admission navigator completed with pt at bedside    AxOx4  RA  VSS  Afib rates controlled on tele  NPO except sips w/meds  LAC PIV LFA PIV  Bed in lowest position  Bed alarm on  Needs met at this time

## 2024-09-07 NOTE — ED QUICK NOTES
Orders for admission, patient is aware of plan and ready to go upstairs. Any questions, please call ED RN Mary at extension [phone].     Patient Covid vaccination status: Fully vaccinated     COVID Test Ordered in ED: None    COVID Suspicion at Admission: N/A    Running Infusions:    dilTIAZem 10 mg/hr (09/07/24 0534)    continuous dose heparin          Mental Status/LOC at time of transport: A&Ox4    Other pertinent information:   CIWA score: N/A   NIH score:  N/A

## 2024-09-07 NOTE — PLAN OF CARE
Assumed care at 0730. No acute distress noted.   Pt A&Ox4.   Room air.   Afib on tele. Cardizem gtt running at 5mg/hr. Heparin gtt at 10mL/hr.   NPO.  Continent, BRP.  Ambulatory, SBA.   Meds per MAR. No c/o pain. No n/v/d.   Family at bedside. Updated on POC.  Cardiology following.   Needs met at this time.     Problem: Diabetes/Glucose Control  Goal: Glucose maintained within prescribed range  Description: INTERVENTIONS:  - Monitor Blood Glucose as ordered  - Assess for signs and symptoms of hyperglycemia and hypoglycemia  - Administer ordered medications to maintain glucose within target range  - Assess barriers to adequate nutritional intake and initiate nutrition consult as needed  - Instruct patient on self management of diabetes  Outcome: Progressing     Problem: CARDIOVASCULAR - ADULT  Goal: Absence of cardiac arrhythmias or at baseline  Description: INTERVENTIONS:  - Continuous cardiac monitoring, monitor vital signs, obtain 12 lead EKG if indicated  - Evaluate effectiveness of antiarrhythmic and heart rate control medications as ordered  - Initiate emergency measures for life threatening arrhythmias  - Monitor electrolytes and administer replacement therapy as ordered  Outcome: Progressing

## 2024-09-07 NOTE — H&P
[facility]     Ambrose Palomares Patient Status:  Emergency    10/30/1960 MRN [id number]   [facility] EMERGENCY DEPARTMENT Attending Ambrose Harris MD   Hosp Day # 0 PCP Cely Mariscal MD     Chief Complaint: dyspnea    Subjective:    History of Present Illness:     Ambrose Palomares is a 63 year old male with PMHx DM/ anxiety/ GERD/ HTN/ HLD/ MITZY (on CPAP) who presented to the hospital for dyspnea that woke him from sleep. He wears his CPAP at night and woke up this morning feeling like he could not get a eep breath. He denied any specific palpitations but notes a strange sensation in his chest. He denied any dizziness, chest pain, cough, fever, chills. He denied any prior history of a-fib. He continues to have dyspnea despite rate control.    History/Other:    Past Medical History:  Past Medical History:    Anxiety    Diabetes (HCC)    Esophageal reflux    Essential hypertension    Gout    Hyperlipidemia     Past Surgical History:   Past Surgical History:   Procedure Laterality Date    Angiogram  2009    Tonsillectomy        Family History:   No family history on file.  Social History:    reports that he has never smoked. He has never used smokeless tobacco. He reports that he does not currently use alcohol. He reports that he does not use drugs.     Allergies: No Known Allergies    Medications:    No current facility-administered medications on file prior to encounter.     Current Outpatient Medications on File Prior to Encounter   Medication Sig Dispense Refill    rosuvastatin 5 MG Oral Tab Take 1 tablet (5 mg total) by mouth nightly.      aspirin 81 MG Oral Tab EC Take 1 tablet (81 mg total) by mouth daily.      tamsulosin 0.4 MG Oral Cap Take 1 capsule (0.4 mg total) by mouth.      metFORMIN 500 MG Oral Tab Take 1 tablet (500 mg total) by mouth 2 (two) times daily with meals.      lisinopril 10 MG Oral Tab Take 1 tablet (10 mg total) by mouth daily.       Loratadine 5 MG Oral Chew Tab Chew 1 tablet (5 mg total) by mouth daily.      vitamin B-12 50 MCG Oral Tab Take 1 tablet (50 mcg total) by mouth daily.      famotidine 20 MG Oral Tab Take 1 tablet (20 mg total) by mouth 2 (two) times daily.      ALLOPURINOL OR Take by mouth.      ESCITALOPRAM OXALATE OR Take by mouth.      Ergocalciferol (VITAMIN D OR) Take by mouth.      Omega-3 Fatty Acids (FISH OIL OR) Take by mouth.         Review of Systems:   A comprehensive review of systems was completed.    Pertinent positives and negatives noted in the HPI.    Objective:   Physical Exam:    /87   Pulse 80   Temp 98.2 °F (36.8 °C) (Oral)   Resp 23   Ht 6' 2\" (1.88 m)   Wt 240 lb (108.9 kg)   SpO2 98%   BMI 30.81 kg/m²   General: No acute distress, Alert  Respiratory: No rhonchi, no wheezes  Cardiovascular: S1, S2, irregular rate  Abdomen: Soft, Non-tender, non-distended, positive bowel sounds  Neuro: No new focal deficits  Extremities: No edema    Results:    Labs:      Labs Last 24 Hours:    Recent Labs   Lab 09/07/24 0426   RBC 5.11   HGB 16.0   HCT 45.0   MCV 88.1   MCH 31.3   MCHC 35.6   RDW 12.5   NEPRELIM 4.47   WBC 8.6   .0       Recent Labs   Lab 09/07/24 0426   *   BUN 24*   CREATSERUM 0.89   EGFRCR 97   CA 10.4   ALB 4.6      K 3.8      CO2 23.0   ALKPHO 105   AST 21   ALT 35   BILT 0.4   TP 7.4       No results found for: \"PT\", \"INR\"    Recent Labs   Lab 09/07/24 0426   TROPHS <3       Recent Labs   Lab 09/07/24 0426   PBNP <35       No results for input(s): \"PCT\" in the last 168 hours.    Imaging: Imaging data reviewed in Epic.    Assessment & Plan:      #new onset A-fib with RVR  -TSH 7.287 with normal free T4  -chest xray without acute process  -s/p carizem 10 mg IV x2 with rate control  -[id number] score of 2: on heparin gtt with close monitoring of pTT  -monitor on telemetry with goal HR <100  -cardiology c/s in ED    #HTN urgency  -BP as high as 159/110  -goal  25% reduction in BP over 24 hrs  -cont home meds    #DM  -SSI, QID checks, hypoglycemia protocol    #HLD  -cont home meds    #BPH  -cont home meds    #GERD  -cont home meds    #anxiety  -cont home meds    #gout  -cont home meds    Plan of care discussed with ED physician    Marah Gudino DO    Supplementary Documentation:     The 21st Century Cures Act makes medical notes like these available to patients in the interest of transparency. Please be advised this is a medical document. Medical documents are intended to carry relevant information, facts as evident, and the clinical opinion of the practitioner. The medical note is intended as peer to peer communication and may appear blunt or direct. It is written in medical language and may contain abbreviations or verbiage that are unfamiliar.

## 2024-09-08 ENCOUNTER — APPOINTMENT (OUTPATIENT)
Dept: CV DIAGNOSTICS | Facility: HOSPITAL | Age: 64
End: 2024-09-08
Attending: INTERNAL MEDICINE
Payer: COMMERCIAL

## 2024-09-08 VITALS
TEMPERATURE: 98 F | OXYGEN SATURATION: 94 % | RESPIRATION RATE: 18 BRPM | DIASTOLIC BLOOD PRESSURE: 75 MMHG | HEART RATE: 70 BPM | SYSTOLIC BLOOD PRESSURE: 112 MMHG | HEIGHT: 74 IN | BODY MASS INDEX: 30.81 KG/M2 | WEIGHT: 240.06 LBS

## 2024-09-08 PROBLEM — M10.9 GOUT: Status: ACTIVE | Noted: 2024-09-08

## 2024-09-08 PROBLEM — E78.5 DYSLIPIDEMIA: Status: ACTIVE | Noted: 2024-09-08

## 2024-09-08 LAB
APTT PPP: 37.9 SECONDS (ref 23–36)
APTT PPP: 45 SECONDS (ref 23–36)
APTT PPP: 49.1 SECONDS (ref 23–36)
GLUCOSE BLD-MCNC: 131 MG/DL (ref 70–99)
GLUCOSE BLD-MCNC: 141 MG/DL (ref 70–99)
MAGNESIUM SERPL-MCNC: 2.1 MG/DL (ref 1.6–2.6)
PLATELET # BLD AUTO: 170 10(3)UL (ref 150–450)

## 2024-09-08 PROCEDURE — 99239 HOSP IP/OBS DSCHRG MGMT >30: CPT | Performed by: HOSPITALIST

## 2024-09-08 PROCEDURE — 93306 TTE W/DOPPLER COMPLETE: CPT | Performed by: INTERNAL MEDICINE

## 2024-09-08 RX ORDER — ALLOPURINOL 100 MG/1
100 TABLET ORAL NIGHTLY
Status: DISCONTINUED | OUTPATIENT
Start: 2024-09-08 | End: 2024-09-08

## 2024-09-08 RX ORDER — ESCITALOPRAM OXALATE 20 MG/1
20 TABLET ORAL NIGHTLY
Status: DISCONTINUED | OUTPATIENT
Start: 2024-09-08 | End: 2024-09-08

## 2024-09-08 RX ORDER — ROSUVASTATIN CALCIUM 5 MG/1
5 TABLET, COATED ORAL NIGHTLY
Status: DISCONTINUED | OUTPATIENT
Start: 2024-09-08 | End: 2024-09-08

## 2024-09-08 RX ORDER — FAMOTIDINE 20 MG/1
20 TABLET, FILM COATED ORAL 2 TIMES DAILY
Status: DISCONTINUED | OUTPATIENT
Start: 2024-09-08 | End: 2024-09-08

## 2024-09-08 RX ORDER — METOPROLOL SUCCINATE 25 MG/1
25 TABLET, EXTENDED RELEASE ORAL
Qty: 90 TABLET | Refills: 3 | Status: SHIPPED | OUTPATIENT
Start: 2024-09-09

## 2024-09-08 NOTE — PLAN OF CARE
A/O x 4  Room air, denies SOB  Tele AFIB, rate controlled  Accucheck QID, glucose stable no insulin needed  Cardiac diet  Denies pain  Heparin gtt at 14 with PTT at 1 am, duly cards following  Standby to restroom  All needs met    Problem: CARDIOVASCULAR - ADULT  Goal: Absence of cardiac arrhythmias or at baseline  Description: INTERVENTIONS:  - Continuous cardiac monitoring, monitor vital signs, obtain 12 lead EKG if indicated  - Evaluate effectiveness of antiarrhythmic and heart rate control medications as ordered  - Initiate emergency measures for life threatening arrhythmias  - Monitor electrolytes and administer replacement therapy as ordered  Outcome: Progressing    Problem: Diabetes/Glucose Control  Goal: Glucose maintained within prescribed range  Description: INTERVENTIONS:  - Monitor Blood Glucose as ordered  - Assess for signs and symptoms of hyperglycemia and hypoglycemia  - Administer ordered medications to maintain glucose within target range  - Assess barriers to adequate nutritional intake and initiate nutrition consult as needed  - Instruct patient on self management of diabetes  Outcome: Progressing

## 2024-09-08 NOTE — PROGRESS NOTES
NURSING DISCHARGE NOTE    Discharged Home via Ambulatory.  Accompanied by Spouse  Belongings Taken by patient/family.    Pt discharged in stable condition. IV and tele removed. Discharge paperwork reviewed in detail with pt and spouse. Verbalized understanding.

## 2024-09-08 NOTE — PLAN OF CARE
Assumed care of patient at 0730. Pt A&Ox4. RA, denies SOB at this time, pt states the SOB occurs mostly while resting. Afib controlled rate. Accucheck QID. Cardiac diet. Pt denies pain and denies n/v. Ambulatory w/ Standby Assist Heparin gtt at 16. Needs met at this time.    Problem: Diabetes/Glucose Control  Goal: Glucose maintained within prescribed range  Description: INTERVENTIONS:  - Monitor Blood Glucose as ordered  - Assess for signs and symptoms of hyperglycemia and hypoglycemia  - Administer ordered medications to maintain glucose within target range  - Assess barriers to adequate nutritional intake and initiate nutrition consult as needed  - Instruct patient on self management of diabetes  Outcome: Progressing       Problem: CARDIOVASCULAR - ADULT  Goal: Absence of cardiac arrhythmias or at baseline  Description: INTERVENTIONS:  - Continuous cardiac monitoring, monitor vital signs, obtain 12 lead EKG if indicated  - Evaluate effectiveness of antiarrhythmic and heart rate control medications as ordered  - Initiate emergency measures for life threatening arrhythmias  - Monitor electrolytes and administer replacement therapy as ordered  Outcome: Progressing

## 2024-09-08 NOTE — PROGRESS NOTES
[facility]  Progress Note    Ambrose Palomares Patient Status:  Inpatient    10/30/1960 MRN [id number]   Location [facility] 3NE-A Attending Shlomo Downs,    Hosp Day # 1 PCP Cely Mariscal MD     Subjective:   Feels better this AM.  No chest pain.  No shortness of breath.  No focal cardiovascular complaints reported.    Objective:  Vitals:    24 0015 24 0500 24 0800 24 1100   BP: 125/78 (!) 126/98 (!) 135/99 112/75   BP Location: Left arm Left arm Left arm Left arm   Pulse: 67 74 75 70   Resp: 18 18 16 18   Temp: 97.8 °F (36.6 °C) 98.2 °F (36.8 °C) 98.4 °F (36.9 °C) 98.3 °F (36.8 °C)   TempSrc: Oral Oral Oral Oral   SpO2: 92% (!) 89% 92% 94%   Weight:       Height:           Temp (24hrs), Av °F (36.7 °C), Min:97.3 °F (36.3 °C), Max:98.4 °F (36.9 °C)      Medications:   Scheduled:    rosuvastatin  5 mg Oral Nightly    escitalopram  20 mg Oral Nightly    famotidine  20 mg Oral BID    allopurinol  100 mg Oral Nightly    metoprolol succinate ER  25 mg Oral Daily Beta Blocker    insulin aspart  2-10 Units Subcutaneous TID CC and HS       Continuous Infusion:    continuous dose heparin 1,800 Units/hr (24 1006)       PRN Medications:     acetaminophen    melatonin    polyethylene glycol (PEG 3350)    sennosides    bisacodyl    fleet enema    ondansetron    prochlorperazine    benzonatate    glycerin-hypromellose-    sodium chloride    glucose **OR** glucose **OR** glucose-vitamin C **OR** dextrose **OR** glucose **OR** glucose **OR** glucose-vitamin C    Intake/Output:   No intake or output data in the 24 hours ending 24 1245    Wt Readings from Last 3 Encounters:   24 240 lb 1.3 oz (108.9 kg)   24 245 lb (111.1 kg)   21 250 lb (113.4 kg)       Allergies:  No Known Allergies    Physical Exam:   General:  Well-developed / Well-nourished.  No acute distress.  HEENT:  Normocephalic.  Atraumatic.  No icterus.  Neck:  There is no jugular venous  distention.   Cardiovascular:  Cardiovascular examination demonstrates a regular rate and rhythm.  There is normal S1, S2.  There is no S3 or S4.  There are no murmurs, rubs, or gallops.  No click is appreciated.  PMI is nondisplaced with a normal apical impulse.    Pulmonary:  Lungs are clear to auscultation bilaterally.  There are no focal rales, rhonchi, or wheezes.  Good air movement is noted throughout both lung fields.   Abdomen:  The abdomen is soft, non-distended, and non-tender.  Bowel sounds are present and normoactive.  No organomegaly is appreciated.  Extremities:  Extremities do not demonstrate any evidence of peripheral edema.   No cyanosis or clubbing of the digits is appreciated.  Neurologic:  Alert and oriented.  Normal affect.  Integument:  No visible rashes are appreciated.      Laboratory/Data:   Recent Labs   Lab 09/07/24 0426 09/07/24  0837   * 151*   BUN 24* 20   CREATSERUM 0.89 0.81   CA 10.4 10.2   ALB 4.6  --     138   K 3.8 4.2    105   CO2 23.0 22.0   ALKPHO 105  --    AST 21  --    ALT 35  --    BILT 0.4  --    TP 7.4  --        Recent Labs   Lab 09/07/24 0426 09/07/24  0837 09/08/24  0856   RBC 5.11 5.23  --    HGB 16.0 16.2  --    HCT 45.0 45.8  --    MCV 88.1 87.6  --    MCH 31.3 31.0  --    MCHC 35.6 35.4  --    RDW 12.5 12.6  --    NEPRELIM 4.47  --   --    WBC 8.6 10.4  --    .0 165.0 170.0       No results for input(s): \"PTP\", \"INR\" in the last 168 hours.    No results for input(s): \"TROP\", \"CK\" in the last 168 hours.      Tele: SR    Diagnostics:    Echo: Pending    Assessment:     AFib  -New onset  -AIDEE 2 VASC = 3 (HTN, DM, Vasc Dz)                -Paroxysmal -> reverted to SR spontaneously during the night  2.     HTN  3.     HL  4.     DM  5.     MITZY  6.     Elevated TSH    Plan:    IV Heparin  -Transition to Eliquis prior to discharge   ToproL XL 25mg Po qAM   Echo   TFTs per primary Svc   Tele monitor   Home today if Echo unremarkable. F/U in 2  weeks.  EP assessment as OP given likely PAF.    Chetan Junior MD  9/8/2024  12:45 PM

## 2024-09-08 NOTE — DISCHARGE SUMMARY
[facility]  DISCHARGE SUMMARY     Ambrose Palomares Patient Status:  Inpatient    10/30/1960 MRN [id number]   Location [facility] 3NE-A Attending Shlomo Downs, DO   Hosp Day # 1 PCP Cely Mariscal MD     Date of Admission: 2024  Date of Discharge:   2024    Discharge Disposition: Home or Self Care    Discharge Diagnosis:  New afib with RVR  Hypertensive urgency  DMII  Dyslipidemia  BPH  GERD  Anxiety  Gout    History of Present Illness: Ambrose Palomares is a 63 year old male with PMHx DM/ anxiety/ GERD/ HTN/ HLD/ MITZY (on CPAP) who presented to the hospital for dyspnea that woke him from sleep. He wears his CPAP at night and woke up this morning feeling like he could not get a eep breath. He denied any specific palpitations but notes a strange sensation in his chest. He denied any dizziness, chest pain, cough, fever, chills. He denied any prior history of a-fib. He continues to have dyspnea despite rate control.     Brief Synopsis:     #New onset A-fib with RVR  -Converted to NSR  -BB/DOAC  -Echo without acute findings   -Cardiology following     #HTN urgency  -BP now controlled      #DMII  -SSI, QID checks, hypoglycemia protocol     #HLD  -Statin     #BPH  -cont home meds     #GERD  -Pepcid     #anxiety  -SSRI     #gout  -Allopurinol    #Disposition  -Stable for DC home     Lace+ Score: 34  59-90 High Risk  29-58 Medium Risk  0-28   Low Risk       TCM Follow-Up Recommendation:  LACE 29-58: Moderate Risk of readmission after discharge from the hospital.      Procedures during hospitalization:   None    Incidental or significant findings and recommendations (brief descriptions):  None    Lab/Test results pending at Discharge:   None    Consultants:  Cardiology    Discharge Medication List:     Discharge Medications        CONTINUE taking these medications        Instructions Prescription details   ALLOPURINOL OR      Take 100 mg by mouth nightly.   Refills: 0     aspirin 81 MG Tbec      Take 1  tablet (81 mg total) by mouth daily.   Refills: 0     ESCITALOPRAM OXALATE OR      Take 20 mg by mouth at bedtime.   Refills: 0     famotidine 20 MG Tabs  Commonly known as: Pepcid      Take 1 tablet (20 mg total) by mouth 2 (two) times daily.   Refills: 0     FISH OIL OR      Take by mouth.   Refills: 0     Loratadine 5 MG Chew      Chew 1 tablet (5 mg total) by mouth daily.   Refills: 0     metFORMIN 500 MG Tabs  Commonly known as: Glucophage      Take 1 tablet (500 mg total) by mouth 2 (two) times daily with meals.   Refills: 0     rosuvastatin 5 MG Tabs  Commonly known as: Crestor      Take 1 tablet (5 mg total) by mouth nightly.   Refills: 0     tamsulosin 0.4 MG Caps  Commonly known as: Flomax      Take 1 capsule (0.4 mg total) by mouth at bedtime.   Refills: 0     vitamin B-12 50 MCG Tabs  Commonly known as: CYANOCOBALAMIN      Take 1 tablet (50 mcg total) by mouth daily.   Refills: 0     VITAMIN D OR      Take by mouth.   Refills: 0            STOP taking these medications      lisinopril 10 MG Tabs  Commonly known as: Zestril                 [facility]    Follow-up appointment:   No follow-up provider specified.  Appointments for Next 30 Days 2024 - 10/8/2024      None          -----------------------------------------------------------------------------------------------  PATIENT DISCHARGE INSTRUCTIONS: See electronic chart    Shlomo Downs DO    Total time spent on discharge plannin minutes     The  [employer] Cures Act makes medical notes like these available to patients in the interest of transparency. Please be advised this is a medical document. Medical documents are intended to carry relevant information, facts as evident, and the clinical opinion of the practitioner. The medical note is intended as peer to peer communication and may appear blunt or direct. It is written in medical language and may contain abbreviations or verbiage that are unfamiliar.

## 2024-09-08 NOTE — PROGRESS NOTES
[facility]   part of [facility]     Hospitalist Progress Note     Ambrose Palomares Patient Status:  Inpatient    10/30/1960 MRN [id number]   Location [facility] 3NE-A Attending Shlomo Downs,    Hosp Day # 1 PCP Cely Mariscal MD     Chief Complaint: SOB    Subjective:     Patient seen and examined. Denies CP/palpitations/SOB.    Objective:    Review of Systems:   A comprehensive review of systems was completed; pertinent positive and negatives stated in subjective.    Vital signs:  Temp:  [97.3 °F (36.3 °C)-98.4 °F (36.9 °C)] 98.3 °F (36.8 °C)  Pulse:  [62-75] 70  Resp:  [16-18] 18  BP: (108-135)/(73-99) 112/75  SpO2:  [89 %-94 %] 94 %    Physical Exam:    General: No acute distress  Respiratory: No wheezes, no rhonchi  Cardiovascular: S1, S2, regular rate and rhythm  Abdomen: Soft, Non-tender, non-distended, positive bowel sounds  Neuro: No new focal deficits.   Extremities: No edema    Diagnostic Data:    Labs:  Recent Labs   Lab 24  0837 24  0856   WBC 8.6 10.4  --    HGB 16.0 16.2  --    MCV 88.1 87.6  --    .0 165.0 170.0       Recent Labs   Lab 246 24  0837   * 151*   BUN 24* 20   CREATSERUM 0.89 0.81   CA 10.4 10.2   ALB 4.6  --     138   K 3.8 4.2    105   CO2 23.0 22.0   ALKPHO 105  --    AST 21  --    ALT 35  --    BILT 0.4  --    TP 7.4  --        Estimated Creatinine Clearance: 111.3 mL/min (based on SCr of 0.81 mg/dL).    Recent Labs   Lab 24   TROPHS <3       No results for input(s): \"PTP\", \"INR\" in the last 168 hours.               Microbiology    No results found for this visit on 24.      Imaging: Reviewed in Epic.    Medications:    rosuvastatin  5 mg Oral Nightly    escitalopram  20 mg Oral Nightly    famotidine  20 mg Oral BID    allopurinol  100 mg Oral Nightly    metoprolol succinate ER  25 mg Oral Daily Beta Blocker    insulin aspart  2-10 Units Subcutaneous TID CC and HS       Assessment  & Plan:      #new onset A-fib with RVR  -Converted to NSR  -BB/DOAC  -Echo pending  -Cardiology following     #HTN urgency  -BP now controlled      #DM  -SSI, QID checks, hypoglycemia protocol     #HLD  -Statin     #BPH  -cont home meds     #GERD  -Pepcid     #anxiety  -SSRI     #gout  -Allopurinol    #Disposition  -DC planning       Shlomo Downs, DO    Supplementary Documentation:     Quality:  DVT Mechanical Prophylaxis:        DVT Pharmacologic Prophylaxis   Medication    heparin (Porcine) 08372 units/250mL infusion ACS/AFIB CONTINUOUS         DVT Pharmacologic prophylaxis: Aspirin 81 mg      Code Status: Not on file  Gillis: No urinary catheter in place  Gillis Duration (in days):   Central line:    AVELINA:     Discharge is dependent on: clinical progress  At this point Mr. Palomares is expected to be discharge to: home    The 21st Century Cures Act makes medical notes like these available to patients in the interest of transparency. Please be advised this is a medical document. Medical documents are intended to carry relevant information, facts as evident, and the clinical opinion of the practitioner. The medical note is intended as peer to peer communication and may appear blunt or direct. It is written in medical language and may contain abbreviations or verbiage that are unfamiliar.

## 2024-09-09 LAB
Q-T INTERVAL: 282 MS
QRS DURATION: 80 MS
QTC CALCULATION (BEZET): 367 MS
R AXIS: 6 DEGREES
T AXIS: 63 DEGREES
VENTRICULAR RATE: 102 BPM

## 2024-09-10 NOTE — PAYOR COMM NOTE
--------------  ADMISSION REVIEW     Payor: ZAKIA REIS  Subscriber #:  [id number]  Authorization Number: [id number]    Admit date: 9/7/24  Admit time:  6:44 AM       History   HPI  Patient is a 63-year-old male presents to ED for evaluation of palpitations, shortness of breath.  Patient states of last 3 or 4 nights he has been noticing shortness of breath that wakes him up from sleep which causes him to be anxious.  Tonight, he noted some fluttering in his chest.  He has no shortness of breath during the day.  Patient denies history of atrial fibrillation.  Patient takes baby aspirin.  Denies history of coronary disease.  Patient states he had a stress test within the last year showing no evidence for heart disease.  Patient denies frequent alcohol use.  Denies fever or cough.  Patient denies thromboembolic risk factors such as family history, smoking, recent travel, recent surgery.    ED Triage Vitals [09/07/24 0425]   BP (!) 159/110   Pulse 110   Resp 21   Temp 98.2 °F (36.8 °C)   Temp src Oral   SpO2 100 %   O2 Device None (Room air)   HEENT: Normocephalic, atraumatic.  Moist mucous membranes.  Pupils equal round reactive to light and accommodation, extraocular motion is intact, sclerae white, conjunctiva is pink.  Oropharynx is unremarkable, no exudate.  NECK: Supple, trachea midline, no lymphadenopathy.   LUNG: Lungs clear to auscultation bilaterally, no wheezing, no rales, no rhonchi.  CARDIOVASCULAR: Tachycardic.  Irregularly irregular rate and rhythm.  Normal S1S2.  No S3S4 or murmur.  ABDOMEN: Bowel sounds are present. Soft. nondistended, no pulsatile masses. nontender  MUSCULOSKELETAL: No calf tenderness.  Dorsalis and Posterior Tibial pulses present. No clubbing. No cyanosis.  No edema.   SKIN EXAMINATIoN: Warm and dry with normal appearance.  No rashes or lesions.  NEUROLOGICAL:  Motor strength intact all groups.  normal sensation, speech intact    Labs Reviewed   COMP METABOLIC PANEL (14) - Abnormal;  Notable for the following components:       Result Value    Glucose 169 (*)     BUN 24 (*)     All other components within normal limits   TSH W REFLEX TO FREE T4 - Abnormal; Notable for the following components:    TSH 7.287 (*)    EKG    Rate, intervals and axes as noted on EKG Report.  Rate: 102  Rhythm: Atrial Fibrillation  Reading: No acute changes    Medications   dilTIAZem 10 mg BOLUS FROM BAG infusion (10 mg Intravenous Bolus from Bag 9/7/24 6098)     And   dilTIAZem (cardIZEM) 100 mg in sodium chloride 0.9% 100 mL IVPB-ADDV (10 mg/hr Intravenous Rate/Dose Change 9/7/24 1589)   heparin (Porcine) 1000 UNIT/ML injection - BOLUS IV 5,000 Units (has no administration in time range)   heparin (Porcine) 84407 units/250mL infusion ED INITIAL DOSE (has no administration in time range)   heparin (Porcine) 35795 units/250mL infusion ACS/AFIB CONTINUOUS (has no administration in time range)   dilTIAZem (cardIZEM) 25 mg/5mL injection 10 mg (10 mg Intravenous Given 9/7/24 0527)       Disposition and Plan   Clinical Impression:  1. Atrial fibrillation with rapid ventricular response (HCC)    2.  Elevated TSH    Disposition:  Admit  9/7/2024  5:42 am      History and Physical   History of Present Illness:   Ambrose Palomares is a 63 year old male with PMHx DM/ anxiety/ GERD/ HTN/ HLD/ MITZY (on CPAP) who presented to the hospital for dyspnea that woke him from sleep. He wears his CPAP at night and woke up this morning feeling like he could not get a eep breath. He denied any specific palpitations but notes a strange sensation in his chest. He denied any dizziness, chest pain, cough, fever, chills. He denied any prior history of a-fib. He continues to have dyspnea despite rate control.    Lab 09/07/24 0426   RBC 5.11   HGB 16.0   HCT 45.0   MCV 88.1   MCH 31.3   MCHC 35.6   RDW 12.5   NEPRELIM 4.47   WBC 8.6   .0      Lab 09/07/24 0426   *   BUN 24*   CREATSERUM 0.89   EGFRCR 97   CA 10.4   ALB 4.6       K 3.8      CO2 23.0   ALKPHO 105   AST 21   ALT 35   BILT 0.4   TP 7.4      Lab 09/07/24  0426   TROPHS <3      Lab 09/07/24  0426   PBNP <35      Assessment & Plan:    #new onset A-fib with RVR  -TSH 7.287 with normal free T4  -chest xray without acute process  -s/p carizem 10 mg IV x2 with rate control  -[id number] score of 2: on heparin gtt with close monitoring of pTT  -monitor on telemetry with goal HR <100  -cardiology c/s in ED     #HTN urgency  -BP as high as 159/110  -goal 25% reduction in BP over 24 hrs  -cont home meds     #DM  -SSI, QID checks, hypoglycemia protocol     #HLD  -cont home meds     #BPH  -cont home meds     #GERD  -cont home meds     #anxiety  -cont home meds     #gout  -cont home meds      CARDIOLOGY  Assessment:   AFib  -New onset  -AIDEE 2 VASC = 3 (HTN, DM, Vasc Dz)                -Rate now controlled  2.     HTN  3.     HL  4.     DM  5.     MITZY  6.     Elevated TSH         Plan:   IV Heparin  -Transition to Eliquis prior to discharge   ToproL XL 25mg Po qAM   Stop Lisinopril   Echo   TFTs per primary Svc   Tele monitor         DATE OF DISCHARGE: 9/8/24    Vitals (last day) before discharge       Date/Time Temp Pulse Resp BP SpO2 Weight O2 Device O2 Flow Rate (L/min) [facility]    09/08/24 1100 98.3 °F (36.8 °C) 70 18 112/75 94 % -- None (Room air) 0 L/min ND    09/08/24 0800 98.4 °F (36.9 °C) 75 16 135/99 92 % -- None (Room air) 0 L/min ND    09/08/24 0500 98.2 °F (36.8 °C) 74 18 126/98 89 % -- None (Room air) -- AM    09/08/24 0015 97.8 °F (36.6 °C) 67 18 125/78 92 % -- None (Room air) -- AM    09/07/24 2000 97.3 °F (36.3 °C) 66 18 111/78 92 % -- None (Room air) -- AM    09/07/24 1702 98 °F (36.7 °C) 62 18 108/73 92 % -- None (Room air) 0 L/min EK    09/07/24 1204 98 °F (36.7 °C) 63 18 105/71 92 % -- None (Room air) 0 L/min EK    09/07/24 0719 98 °F (36.7 °C) 65 19 112/76 94 % -- None (Room air) 0 L/min EK    09/07/24 0428 -- 91 21 145/98 100 % -- None (Room air) --     09/07/24  0425 98.2 °F (36.8 °C) 110 21 159/110 100 % 240 lb (108.9 kg) None (Room air) -- LJ

## 2024-09-10 NOTE — PAYOR COMM NOTE
--------------  DISCHARGE REVIEW    Payor: ZAKIA REIS  Subscriber #:  [id number]  Authorization Number: [id number]    Admit date: 9/7/24  Admit time:   6:44 AM  Discharge Date: 9/8/2024  5:22 PM         DISCHARGE SUMMARY       Date of Admission: 9/7/2024  Date of Discharge:   9/8/2024    Discharge Disposition: Home or Self Care    Discharge Diagnosis:  New afib with RVR  Hypertensive urgency  DMII  Dyslipidemia  BPH  GERD  Anxiety  Gout    History of Present Illness: Ambrose Palomares is a 63 year old male with PMHx DM/ anxiety/ GERD/ HTN/ HLD/ MITZY (on CPAP) who presented to the hospital for dyspnea that woke him from sleep. He wears his CPAP at night and woke up this morning feeling like he could not get a eep breath. He denied any specific palpitations but notes a strange sensation in his chest. He denied any dizziness, chest pain, cough, fever, chills. He denied any prior history of a-fib. He continues to have dyspnea despite rate control.     Brief Synopsis:     #New onset A-fib with RVR  -Converted to NSR  -BB/DOAC  -Echo without acute findings   -Cardiology following     #HTN urgency  -BP now controlled      #DMII  -SSI, QID checks, hypoglycemia protocol     #HLD  -Statin     #BPH  -cont home meds     #GERD  -Pepcid     #anxiety  -SSRI     #gout  -Allopurinol    #Disposition  -Stable for DC home

## 2024-10-22 ENCOUNTER — PREP FOR CASE (OUTPATIENT)
Dept: CARDIOLOGY | Age: 64
End: 2024-10-22

## 2024-10-22 DIAGNOSIS — I48.0 PAROXYSMAL ATRIAL FIBRILLATION  (CMD): Primary | ICD-10-CM

## 2024-11-25 ENCOUNTER — IMAGING SERVICES (OUTPATIENT)
Dept: OTHER | Age: 64
End: 2024-11-25

## 2024-12-06 RX ORDER — METOPROLOL SUCCINATE 25 MG/1
25 TABLET, EXTENDED RELEASE ORAL DAILY
COMMUNITY

## 2024-12-06 RX ORDER — ESCITALOPRAM OXALATE 20 MG/1
20 TABLET ORAL AT BEDTIME
COMMUNITY

## 2024-12-06 RX ORDER — GINSENG 100 MG
50 CAPSULE ORAL AT BEDTIME
COMMUNITY

## 2024-12-06 RX ORDER — FAMOTIDINE 20 MG/1
20 TABLET, FILM COATED ORAL 2 TIMES DAILY
COMMUNITY

## 2024-12-06 RX ORDER — OMEGA-3 FATTY ACIDS/FISH OIL 300-1000MG
1000 CAPSULE ORAL 2 TIMES DAILY
COMMUNITY

## 2024-12-06 RX ORDER — LORATADINE 10 MG/1
10 TABLET ORAL DAILY
COMMUNITY

## 2024-12-06 RX ORDER — ROSUVASTATIN CALCIUM 5 MG/1
5 TABLET, COATED ORAL DAILY
COMMUNITY

## 2024-12-06 RX ORDER — CLOBETASOL PROPIONATE 0.5 MG/G
1 CREAM TOPICAL 2 TIMES DAILY PRN
COMMUNITY

## 2024-12-06 RX ORDER — AMOXICILLIN 500 MG/1
2000 TABLET, FILM COATED ORAL SEE ADMIN INSTRUCTIONS
COMMUNITY

## 2024-12-06 RX ORDER — TAMSULOSIN HYDROCHLORIDE 0.4 MG/1
0.4 CAPSULE ORAL
COMMUNITY

## 2024-12-06 RX ORDER — ALLOPURINOL 100 MG/1
100 TABLET ORAL 2 TIMES DAILY
COMMUNITY

## 2024-12-06 RX ORDER — ASCORBIC ACID 500 MG
500 TABLET ORAL AT BEDTIME
COMMUNITY

## 2024-12-10 ENCOUNTER — HOSPITAL ENCOUNTER (OUTPATIENT)
Dept: CARDIOLOGY | Age: 64
Discharge: HOME | End: 2024-12-10
Attending: INTERNAL MEDICINE | Admitting: INTERNAL MEDICINE

## 2024-12-10 ENCOUNTER — HOSPITAL ENCOUNTER (OUTPATIENT)
Age: 64
Discharge: HOME | End: 2024-12-10
Attending: INTERNAL MEDICINE | Admitting: INTERNAL MEDICINE

## 2024-12-10 ENCOUNTER — ANESTHESIA (OUTPATIENT)
Dept: CARDIOLOGY | Age: 64
End: 2024-12-10

## 2024-12-10 ENCOUNTER — ANESTHESIA EVENT (OUTPATIENT)
Dept: CARDIOLOGY | Age: 64
End: 2024-12-10

## 2024-12-10 VITALS
DIASTOLIC BLOOD PRESSURE: 77 MMHG | RESPIRATION RATE: 16 BRPM | SYSTOLIC BLOOD PRESSURE: 127 MMHG | HEIGHT: 73 IN | OXYGEN SATURATION: 95 % | BODY MASS INDEX: 32.37 KG/M2 | HEART RATE: 64 BPM | WEIGHT: 244.27 LBS | TEMPERATURE: 97 F

## 2024-12-10 DIAGNOSIS — I48.0 PAROXYSMAL ATRIAL FIBRILLATION  (CMD): ICD-10-CM

## 2024-12-10 LAB
ACT BLD: 320 BASELINE/TARGET RANGES ARE SET BY CLINICIANS FOR EACH PATIENT/PROCEDURE
ACT BLD: 337 BASELINE/TARGET RANGES ARE SET BY CLINICIANS FOR EACH PATIENT/PROCEDURE
ATRIAL RATE (BPM): 65
GLUCOSE BLDC GLUCOMTR-MCNC: 156 MG/DL (ref 70–99)
GLUCOSE BLDC GLUCOMTR-MCNC: 160 MG/DL (ref 70–99)
P AXIS (DEGREES): 73
PR-INTERVAL (MSEC): 248
QRS-INTERVAL (MSEC): 86
QT-INTERVAL (MSEC): 396
QTC: 412
R AXIS (DEGREES): 18
REPORT TEXT: NORMAL
T AXIS (DEGREES): 55
VENTRICULAR RATE EKG/MIN (BPM): 65

## 2024-12-10 PROCEDURE — 93656 COMPRE EP EVAL ABLTJ ATR FIB: CPT | Performed by: INTERNAL MEDICINE

## 2024-12-10 PROCEDURE — C1759 CATH, INTRA ECHOCARDIOGRAPHY: HCPCS | Performed by: INTERNAL MEDICINE

## 2024-12-10 PROCEDURE — 10002800 HB RX 250 W HCPCS: Performed by: ANESTHESIOLOGY

## 2024-12-10 PROCEDURE — C1769 GUIDE WIRE: HCPCS | Performed by: INTERNAL MEDICINE

## 2024-12-10 PROCEDURE — 82962 GLUCOSE BLOOD TEST: CPT

## 2024-12-10 PROCEDURE — 10006023 HB SUPPLY 272: Performed by: INTERNAL MEDICINE

## 2024-12-10 PROCEDURE — 13000001 HB PHASE II RECOVERY EA 30 MINUTES: Performed by: INTERNAL MEDICINE

## 2024-12-10 PROCEDURE — C1733 CATH, EP, OTHR THAN COOL-TIP: HCPCS | Performed by: INTERNAL MEDICINE

## 2024-12-10 PROCEDURE — 93010 ELECTROCARDIOGRAM REPORT: CPT | Performed by: INTERNAL MEDICINE

## 2024-12-10 PROCEDURE — 13000004 HB  ANESTHESIA  GENERAL OUTSIDE OR: Performed by: INTERNAL MEDICINE

## 2024-12-10 PROCEDURE — C1730 CATH, EP, 19 OR FEW ELECT: HCPCS | Performed by: INTERNAL MEDICINE

## 2024-12-10 PROCEDURE — 10002807 HB RX 258: Performed by: ANESTHESIOLOGY

## 2024-12-10 PROCEDURE — C1766 INTRO/SHEATH,STRBLE,NON-PEEL: HCPCS | Performed by: INTERNAL MEDICINE

## 2024-12-10 PROCEDURE — 10004452 HB PACU ADDL 30 MINUTES: Performed by: INTERNAL MEDICINE

## 2024-12-10 PROCEDURE — C1894 INTRO/SHEATH, NON-LASER: HCPCS | Performed by: INTERNAL MEDICINE

## 2024-12-10 PROCEDURE — C1760 CLOSURE DEV, VASC: HCPCS | Performed by: INTERNAL MEDICINE

## 2024-12-10 PROCEDURE — 93005 ELECTROCARDIOGRAM TRACING: CPT | Performed by: INTERNAL MEDICINE

## 2024-12-10 PROCEDURE — 10004451 HB PACU RECOVERY 1ST 30 MINUTES: Performed by: INTERNAL MEDICINE

## 2024-12-10 PROCEDURE — 10006027 HB SUPPLY 278: Performed by: INTERNAL MEDICINE

## 2024-12-10 PROCEDURE — 85347 COAGULATION TIME ACTIVATED: CPT | Performed by: INTERNAL MEDICINE

## 2024-12-10 DEVICE — THE VASCADE VCS IS INTENDED TO SEAL FEMORAL VESSEL PUNCTURE SITES AT THE COMPLETION OF CATHETER-BASED PROCEDURES.  THE SYSTEM IS DESIGNED TO DELIVER A RESORBABLE COLLAGEN PATCH, EXTRA-VASCULARLY, AT THE VESSEL PUNCTURE SITE TO ACHIEVE HEMOSTASIS.   FOR USE IN 6F & 7F INTRODUCER SHEATHS; OVERALL LENGTH OF THE SHEATH (INCLUDING THE HUB) NEEDS TO BE LESS THAN 15CM.
Type: IMPLANTABLE DEVICE | Site: FEMORAL VEIN | Status: FUNCTIONAL
Brand: CARDIVA VASCADE 6/7F VCS

## 2024-12-10 DEVICE — THE VASCADE MVP XL VENOUS VASCULAR CLOSURE SYSTEM (VVCS) 10-12F IS INTENDED TO SEAL FEMORAL VEINS WITH SINGLE OR MULTIPLE ACCESS SITES IN ONE OR BOTH LIMBS AT THE COMPLETION OF CATHETERIZATION PROCEDURES. THE SYSTEM IS DESIGNED TO DELIVER A RESORBABLE COLLAGEN PATCH, EXTRA-VASCULARLY, AT VESSEL PUNCTURE SITES TO ACHIEVE HEMOSTASIS. FOR USE WITH 10FR TO 12FR (15F MAXIMUM OUTER DIAMETER) INTRODUCER SHEATHS; OVERALL LENGTH OF THE SHEATH (INCLUDING THE HUB) NEEDS TO BE LESS THAN 15CM.
Type: IMPLANTABLE DEVICE | Site: FEMORAL VEIN | Status: FUNCTIONAL
Brand: CARDIVA VASCADE MVP XL VVCS 10-12F

## 2024-12-10 RX ORDER — HYDROCODONE BITARTRATE AND ACETAMINOPHEN 5; 325 MG/1; MG/1
1 TABLET ORAL EVERY 4 HOURS PRN
Status: DISCONTINUED | OUTPATIENT
Start: 2024-12-10 | End: 2024-12-10 | Stop reason: HOSPADM

## 2024-12-10 RX ORDER — METOCLOPRAMIDE HYDROCHLORIDE 5 MG/ML
5 INJECTION INTRAMUSCULAR; INTRAVENOUS EVERY 6 HOURS PRN
Status: DISCONTINUED | OUTPATIENT
Start: 2024-12-10 | End: 2024-12-10 | Stop reason: HOSPADM

## 2024-12-10 RX ORDER — NICOTINE POLACRILEX 4 MG
30 LOZENGE BUCCAL
Status: DISCONTINUED | OUTPATIENT
Start: 2024-12-10 | End: 2024-12-10 | Stop reason: HOSPADM

## 2024-12-10 RX ORDER — 0.9 % SODIUM CHLORIDE 0.9 %
10 VIAL (ML) INJECTION PRN
Status: DISCONTINUED | OUTPATIENT
Start: 2024-12-10 | End: 2024-12-10 | Stop reason: HOSPADM

## 2024-12-10 RX ORDER — PROCHLORPERAZINE EDISYLATE 5 MG/ML
5 INJECTION INTRAMUSCULAR; INTRAVENOUS EVERY 4 HOURS PRN
Status: DISCONTINUED | OUTPATIENT
Start: 2024-12-10 | End: 2024-12-10 | Stop reason: HOSPADM

## 2024-12-10 RX ORDER — DROPERIDOL 2.5 MG/ML
0.62 INJECTION, SOLUTION INTRAMUSCULAR; INTRAVENOUS
Status: DISCONTINUED | OUTPATIENT
Start: 2024-12-10 | End: 2024-12-10 | Stop reason: HOSPADM

## 2024-12-10 RX ORDER — ONDANSETRON 2 MG/ML
4 INJECTION INTRAMUSCULAR; INTRAVENOUS 2 TIMES DAILY PRN
Status: DISCONTINUED | OUTPATIENT
Start: 2024-12-10 | End: 2024-12-10 | Stop reason: SDUPTHER

## 2024-12-10 RX ORDER — ONDANSETRON 2 MG/ML
4 INJECTION INTRAMUSCULAR; INTRAVENOUS EVERY 12 HOURS PRN
Status: DISCONTINUED | OUTPATIENT
Start: 2024-12-10 | End: 2024-12-10 | Stop reason: HOSPADM

## 2024-12-10 RX ORDER — ONDANSETRON 4 MG/1
4 TABLET, ORALLY DISINTEGRATING ORAL EVERY 12 HOURS PRN
Status: DISCONTINUED | OUTPATIENT
Start: 2024-12-10 | End: 2024-12-10 | Stop reason: HOSPADM

## 2024-12-10 RX ORDER — 0.9 % SODIUM CHLORIDE 0.9 %
2 VIAL (ML) INJECTION EVERY 12 HOURS SCHEDULED
Status: DISCONTINUED | OUTPATIENT
Start: 2024-12-10 | End: 2024-12-10 | Stop reason: HOSPADM

## 2024-12-10 RX ORDER — PROTAMINE SULFATE 10 MG/ML
INJECTION, SOLUTION INTRAVENOUS PRN
Status: DISCONTINUED | OUTPATIENT
Start: 2024-12-10 | End: 2024-12-10

## 2024-12-10 RX ORDER — PROPOFOL 10 MG/ML
INJECTION, EMULSION INTRAVENOUS PRN
Status: DISCONTINUED | OUTPATIENT
Start: 2024-12-10 | End: 2024-12-10

## 2024-12-10 RX ORDER — DEXTROSE MONOHYDRATE 25 G/50ML
25 INJECTION, SOLUTION INTRAVENOUS PRN
Status: DISCONTINUED | OUTPATIENT
Start: 2024-12-10 | End: 2024-12-10 | Stop reason: HOSPADM

## 2024-12-10 RX ORDER — ONDANSETRON 2 MG/ML
INJECTION INTRAMUSCULAR; INTRAVENOUS PRN
Status: DISCONTINUED | OUTPATIENT
Start: 2024-12-10 | End: 2024-12-10

## 2024-12-10 RX ORDER — SODIUM CHLORIDE, SODIUM LACTATE, POTASSIUM CHLORIDE, CALCIUM CHLORIDE 600; 310; 30; 20 MG/100ML; MG/100ML; MG/100ML; MG/100ML
INJECTION, SOLUTION INTRAVENOUS CONTINUOUS PRN
Status: DISCONTINUED | OUTPATIENT
Start: 2024-12-10 | End: 2024-12-10

## 2024-12-10 RX ADMIN — PROPOFOL 150 MG: 10 INJECTION, EMULSION INTRAVENOUS at 08:27

## 2024-12-10 RX ADMIN — FENTANYL CITRATE 50 MCG: 50 INJECTION INTRAMUSCULAR; INTRAVENOUS at 09:02

## 2024-12-10 RX ADMIN — FENTANYL CITRATE 100 MCG: 50 INJECTION INTRAMUSCULAR; INTRAVENOUS at 08:23

## 2024-12-10 RX ADMIN — HEPARIN SODIUM 16000 UNITS: 1000 INJECTION INTRAVENOUS; SUBCUTANEOUS at 08:42

## 2024-12-10 RX ADMIN — ONDANSETRON 4 MG: 2 INJECTION INTRAMUSCULAR; INTRAVENOUS at 09:35

## 2024-12-10 RX ADMIN — SODIUM CHLORIDE, POTASSIUM CHLORIDE, SODIUM LACTATE AND CALCIUM CHLORIDE: 600; 310; 30; 20 INJECTION, SOLUTION INTRAVENOUS at 08:12

## 2024-12-10 RX ADMIN — PROTAMINE SULFATE 50 MG: 10 INJECTION, SOLUTION INTRAVENOUS at 09:31

## 2024-12-10 RX ADMIN — FENTANYL CITRATE 50 MCG: 50 INJECTION INTRAMUSCULAR; INTRAVENOUS at 08:44

## 2024-12-10 ASSESSMENT — PAIN SCALES - GENERAL
PAINLEVEL_OUTOF10: 0

## 2025-05-14 DIAGNOSIS — H91.90 HEARING LOSS: Primary | ICD-10-CM

## 2025-05-20 ENCOUNTER — LAB SERVICES (OUTPATIENT)
Dept: LAB | Age: 65
End: 2025-05-20

## 2025-05-20 DIAGNOSIS — H91.20 SUDDEN IDIOPATHIC HEARING LOSS, UNSPECIFIED EAR: Primary | ICD-10-CM

## 2025-05-20 LAB
BUN SERPL-MCNC: 22 MG/DL (ref 6–20)
BUN/CREAT SERPL: 22 (ref 7–25)
CREAT SERPL-MCNC: 0.98 MG/DL (ref 0.67–1.17)
EGFRCR SERPLBLD CKD-EPI 2021: 87 ML/MIN/{1.73_M2}

## 2025-05-20 PROCEDURE — 84520 ASSAY OF UREA NITROGEN: CPT | Performed by: CLINICAL MEDICAL LABORATORY

## 2025-05-20 PROCEDURE — 36415 COLL VENOUS BLD VENIPUNCTURE: CPT | Performed by: CLINICAL MEDICAL LABORATORY

## 2025-05-20 PROCEDURE — 82565 ASSAY OF CREATININE: CPT | Performed by: CLINICAL MEDICAL LABORATORY

## 2025-05-21 ENCOUNTER — APPOINTMENT (OUTPATIENT)
Dept: MRI IMAGING | Age: 65
End: 2025-05-21
Attending: OTOLARYNGOLOGY

## 2025-08-14 ENCOUNTER — HOSPITAL ENCOUNTER (OUTPATIENT)
Dept: MRI IMAGING | Age: 65
Discharge: HOME | End: 2025-08-14
Attending: OTOLARYNGOLOGY

## 2025-08-14 DIAGNOSIS — H91.90 HEARING LOSS: ICD-10-CM

## 2025-08-14 PROCEDURE — A9585 GADOBUTROL INJECTION: HCPCS | Performed by: OTOLARYNGOLOGY

## 2025-08-14 PROCEDURE — 70553 MRI BRAIN STEM W/O & W/DYE: CPT

## 2025-08-14 PROCEDURE — 10002805 HB CONTRAST AGENT: Performed by: OTOLARYNGOLOGY

## 2025-08-14 RX ORDER — GADOBUTROL 604.72 MG/ML
10 INJECTION INTRAVENOUS ONCE
Status: COMPLETED | OUTPATIENT
Start: 2025-08-14 | End: 2025-08-14

## 2025-08-14 RX ADMIN — GADOBUTROL 10 ML: 604.72 INJECTION INTRAVENOUS at 18:37

## (undated) DEVICE — Device

## (undated) DEVICE — CABLE CATH CNCT FARASTAR

## (undated) DEVICE — DRESSING TRANS 4.75X4IN ADH HPOAL WTPRF TEGADERM PU STD STRL

## (undated) DEVICE — ELECTRODE DFBR ADULT L6 IN X W4.25 IN MULTIFUNCTION

## (undated) DEVICE — INTRODUCER SHTH OD7 FR L12 CM HEMOSTASIS VLV SDPRT DIL

## (undated) DEVICE — ELECTRODE PT RTN C30- LB 9FT CORD NONIRRITATE NONSENSITIZE

## (undated) DEVICE — GOWN SURG XL L3 NONREINFORCE SET IN SLV STRL LF DISP BLUE

## (undated) DEVICE — GUIDEWIRE VASC OD.035 IN L210 CM INQWIREÂ® 1.5 MM J CRV FX

## (undated) DEVICE — HOLDER LIMB THK.25IN 13X3IN 31IN 2 PC 1 STRAP QRLSE BCKL

## (undated) DEVICE — BLANKET WRM UNDERBODY ADULT 221X91IN 24X48IN BR HGR PLMR 7OZ

## (undated) DEVICE — KIT ELECTRODE SRFC ENSITE

## (undated) DEVICE — SHEATH 8.5FR 63CM 180CM 55D 1 CRV PGTL GUIDE DIL RF WIRE

## (undated) DEVICE — SYRINGE 20 ML GRAD LOK MED

## (undated) DEVICE — DRESSING GAUZE 1.5X1.5IN HMST QUIKCLOT

## (undated) DEVICE — GLOVE SURG 6.5 PROTEXIS LF CRM PF BEAD CUFF STRL PLISPRN

## (undated) DEVICE — CATHETER UNDIR STRBL 10 ELECTRODE 6FR 2-5-2MM SPACE LG CRV

## (undated) DEVICE — CATHETER ABLT OTW 20 ELECTRODE FARAWAVE L115 CM L180 CM OD31

## (undated) DEVICE — GLOVE SURG 7.5 PROTEXIS LF CRM PF SMTH BEAD CUFF STRL

## (undated) DEVICE — CATHETER US VIEWFLEX X ICE REPRO

## (undated) DEVICE — SHEATH INTRO L74 CM STRBL ID13 FR FARADRIVE

## (undated) DEVICE — SET ANGIO 72IN IV TUBE MIC DRIP CHMBR MH FLTR MALE LL STRL

## (undated) DEVICE — DECANTER FLUID DSPNSR BAG BAG-A-JET ASEPTIC TRNSF

## (undated) DEVICE — CATHETER MAPPING 105CM 8FR ADVISOR HD SNSR ENABLED 3MM SPACE

## (undated) DEVICE — PEN SURGMRK DEVON SKIN DISP NONSMEAR REG TIP FLXB RULER LBL